# Patient Record
Sex: FEMALE | Race: WHITE | NOT HISPANIC OR LATINO | URBAN - METROPOLITAN AREA
[De-identification: names, ages, dates, MRNs, and addresses within clinical notes are randomized per-mention and may not be internally consistent; named-entity substitution may affect disease eponyms.]

---

## 2018-01-29 ENCOUNTER — TRANSFERRED RECORDS (OUTPATIENT)
Dept: HEALTH INFORMATION MANAGEMENT | Facility: CLINIC | Age: 23
End: 2018-01-29

## 2018-02-12 ENCOUNTER — OFFICE VISIT (OUTPATIENT)
Dept: DERMATOLOGY | Facility: CLINIC | Age: 23
End: 2018-02-12
Payer: COMMERCIAL

## 2018-02-12 VITALS — OXYGEN SATURATION: 100 % | DIASTOLIC BLOOD PRESSURE: 73 MMHG | HEART RATE: 69 BPM | SYSTOLIC BLOOD PRESSURE: 111 MMHG

## 2018-02-12 DIAGNOSIS — L65.9 LOSS OF HAIR: ICD-10-CM

## 2018-02-12 DIAGNOSIS — L65.9 LOSS OF HAIR: Primary | ICD-10-CM

## 2018-02-12 DIAGNOSIS — J02.9 SORE THROAT: ICD-10-CM

## 2018-02-12 DIAGNOSIS — L21.9 DERMATITIS, SEBORRHEIC: ICD-10-CM

## 2018-02-12 DIAGNOSIS — L63.9 AA (ALOPECIA AREATA): ICD-10-CM

## 2018-02-12 LAB
ALBUMIN SERPL-MCNC: 4.5 G/DL (ref 3.4–5)
ALP SERPL-CCNC: 48 U/L (ref 40–150)
ALT SERPL W P-5'-P-CCNC: 21 U/L (ref 0–50)
ANION GAP SERPL CALCULATED.3IONS-SCNC: 6 MMOL/L (ref 3–14)
AST SERPL W P-5'-P-CCNC: 10 U/L (ref 0–45)
BASOPHILS # BLD AUTO: 0.1 10E9/L (ref 0–0.2)
BASOPHILS NFR BLD AUTO: 0.9 %
BILIRUB SERPL-MCNC: 0.5 MG/DL (ref 0.2–1.3)
BUN SERPL-MCNC: 12 MG/DL (ref 7–30)
CALCIUM SERPL-MCNC: 8.9 MG/DL (ref 8.5–10.1)
CHLORIDE SERPL-SCNC: 107 MMOL/L (ref 94–109)
CO2 SERPL-SCNC: 25 MMOL/L (ref 20–32)
CREAT SERPL-MCNC: 0.6 MG/DL (ref 0.52–1.04)
DEPRECATED CALCIDIOL+CALCIFEROL SERPL-MC: 31 UG/L (ref 20–75)
DEPRECATED S PYO AG THROAT QL EIA: NORMAL
DHEA-S SERPL-MCNC: 152 UG/DL (ref 35–430)
DIFFERENTIAL METHOD BLD: ABNORMAL
EOSINOPHIL # BLD AUTO: 0.1 10E9/L (ref 0–0.7)
EOSINOPHIL NFR BLD AUTO: 1.8 %
ERYTHROCYTE [DISTWIDTH] IN BLOOD BY AUTOMATED COUNT: 13.6 % (ref 10–15)
FERRITIN SERPL-MCNC: 34 NG/ML (ref 12–150)
GFR SERPL CREATININE-BSD FRML MDRD: >90 ML/MIN/1.7M2
GLUCOSE SERPL-MCNC: 84 MG/DL (ref 70–99)
HCT VFR BLD AUTO: 41.6 % (ref 35–47)
HGB BLD-MCNC: 13.1 G/DL (ref 11.7–15.7)
IMM GRANULOCYTES # BLD: 0 10E9/L (ref 0–0.4)
IMM GRANULOCYTES NFR BLD: 0.2 %
IRON SATN MFR SERPL: 19 % (ref 15–46)
IRON SERPL-MCNC: 68 UG/DL (ref 35–180)
LYMPHOCYTES # BLD AUTO: 1.5 10E9/L (ref 0.8–5.3)
LYMPHOCYTES NFR BLD AUTO: 27.5 %
MCH RBC QN AUTO: 30.7 PG (ref 26.5–33)
MCHC RBC AUTO-ENTMCNC: 31.5 G/DL (ref 31.5–36.5)
MCV RBC AUTO: 97 FL (ref 78–100)
MONOCYTES # BLD AUTO: 0.5 10E9/L (ref 0–1.3)
MONOCYTES NFR BLD AUTO: 8.3 %
NEUTROPHILS # BLD AUTO: 3.3 10E9/L (ref 1.6–8.3)
NEUTROPHILS NFR BLD AUTO: 61.3 %
NRBC # BLD AUTO: 0 10*3/UL
NRBC BLD AUTO-RTO: 0 /100
PLATELET # BLD AUTO: 138 10E9/L (ref 150–450)
POTASSIUM SERPL-SCNC: 3.7 MMOL/L (ref 3.4–5.3)
PROT SERPL-MCNC: 8.2 G/DL (ref 6.8–8.8)
RBC # BLD AUTO: 4.27 10E12/L (ref 3.8–5.2)
SODIUM SERPL-SCNC: 138 MMOL/L (ref 133–144)
SPECIMEN SOURCE: NORMAL
TIBC SERPL-MCNC: 357 UG/DL (ref 240–430)
WBC # BLD AUTO: 5.4 10E9/L (ref 4–11)

## 2018-02-12 RX ORDER — CLOBETASOL PROPIONATE 0.05 G/100ML
SHAMPOO TOPICAL
Qty: 1 BOTTLE | Refills: 11 | Status: SHIPPED | OUTPATIENT
Start: 2018-02-12 | End: 2019-01-15

## 2018-02-12 RX ORDER — CLOBETASOL PROPIONATE 0.5 MG/G
AEROSOL, FOAM TOPICAL
Qty: 100 G | Refills: 1 | Status: SHIPPED | OUTPATIENT
Start: 2018-02-12

## 2018-02-12 RX ORDER — KETOCONAZOLE 20 MG/ML
SHAMPOO TOPICAL
Qty: 120 ML | Refills: 11 | Status: SHIPPED | OUTPATIENT
Start: 2018-02-12

## 2018-02-12 ASSESSMENT — PAIN SCALES - GENERAL: PAINLEVEL: NO PAIN (0)

## 2018-02-12 NOTE — NURSING NOTE
Dermatology Rooming Note    Amber Fritz's goals for this visit include:   Chief Complaint   Patient presents with     Hair Loss     Amber is here for hair loss. She was referred from another provider. Pt states that other treatments where not helping.

## 2018-02-12 NOTE — PATIENT INSTRUCTIONS
-Start clobetasol shampoo three times per week; massage into dry scalp, leave on for 10 minutes, then lather and rinse out  -Start ketoconazole shampoo three times per week alternating with clobetasol shampoo; massage into wet scalp, leave in for 2-3 minutes, then lather and rinse  -Start clobetasol foam twice a day on new patches  -We will draw labs today and follow up with you about these in a few weeks

## 2018-02-12 NOTE — MR AVS SNAPSHOT
After Visit Summary   2/12/2018    Amber Fritz    MRN: [id number]           Patient Information     Date Of Birth          1995        Visit Information        Provider Department      2/12/2018 9:00 AM Josette Peña MD [facility]        Today's Diagnoses     Loss of hair    -  1    Sore throat        AA (alopecia areata)        Dermatitis, seborrheic          Care Instructions    -Start clobetasol shampoo three times per week; massage into dry scalp, leave on for 10 minutes, then lather and rinse out  -Start ketoconazole shampoo three times per week alternating with clobetasol shampoo; massage into wet scalp, leave in for 2-3 minutes, then lather and rinse  -Start clobetasol foam twice a day on new patches  -We will draw labs today and follow up with you about these in a few weeks            Follow-ups after your visit        Your next 10 appointments already scheduled     Feb 12, 2018 10:30 AM CST   LAB with  LAB   [facility] ([facility])    [address] [place] [id number] [phone]           Please do not eat 10-12 hours before your appointment if you are coming in fasting for labs on lipids, cholesterol, or glucose (sugar). This does not apply to pregnant women. Water, hot tea and black coffee (with nothing added) are okay. Do not drink other fluids, diet soda or chew gum.            Mar 22, 2018 11:45 AM CDT   (Arrive by 11:30 AM)   Return Visit with Josette Peña MD   [facility] ([facility])    [address] [place] [id number] [phone]              Future tests that were ordered for you today     Open Future Orders        Priority Expected Expires Ordered    CBC with platelets differential Routine  2/12/2019 2/12/2018    Iron and iron binding capacity Routine  2/12/2019 2/12/2018    Ferritin Routine  2/12/2019 2/12/2018     DHEA sulfate Routine  2019    Testosterone Free and Total Routine  2019    Zinc Routine  2019    IgE Routine  2019    Vitamin D Deficiency Routine  2019    Comprehensive metabolic panel Routine  2019            Who to contact     Please call your clinic at [phone] to:    Ask questions about your health    Make or cancel appointments    Discuss your medicines    Learn about your test results    Speak to your doctor            Additional Information About Your Visit        [employer] Information     [employer] is an electronic gateway that provides easy, online access to your medical records. With [employer], you can request a clinic appointment, read your test results, renew a prescription or communicate with your care team.     To sign up for [employer] visit the website at [email or web address]   You will be asked to enter the access code listed below, as well as some personal information. Please follow the directions to create your username and password.     Your access code is: [email or web address]  Expires: 2018  1:42 PM     Your access code will  in 90 days. If you need help or a new code, please contact your [facility] or call [phone] for assistance.        Care EveryWhere ID     This is your Care EveryWhere ID. This could be used by other organizations to access your [place] medical records  [id number]        Your Vitals Were     Pulse Pulse Oximetry                69 100%           Blood Pressure from Last 3 Encounters:   18 111/73    Weight from Last 3 Encounters:   No data found for Wt              We Performed the Following     Rapid strep screen          Today's Medication Changes          These changes are accurate as of 18 10:17 AM.  If you have any questions, ask your nurse or doctor.               Start taking these medicines.        Dose/Directions    *  clobetasol propionate 0.05 % Sham   Used for:  AA (alopecia areata)   Started by:  Josette Peña MD        Use three times per week. Apply to dry scalp, leave on for 10 minutes, then lather and rinse out.   Quantity:  1 Bottle   Refills:  11       * clobetasol propionate 0.05 % Foam   Used for:  AA (alopecia areata)   Started by:  Josette Peña MD        Apply sparingly to affected area twice daily as needed for new spots   Quantity:  100 g   Refills:  1       ketoconazole 2 % shampoo   Commonly known as:  NIZORAL   Used for:  Dermatitis, seborrheic   Started by:  Josette Peña MD        Apply to wet hair and massage into scalp, leave in for 2-3 minutes, then rinse   Quantity:  120 mL   Refills:  11       * Notice:  This list has 2 medication(s) that are the same as other medications prescribed for you. Read the directions carefully, and ask your doctor or other care provider to review them with you.         Where to get your medicines      These medications were sent to [facility] - [place], [place] - [address] [facility]  [address] [facility] [place] [address]     Phone:  [phone]     clobetasol propionate 0.05 % Foam    clobetasol propionate 0.05 % Sham    ketoconazole 2 % shampoo                Primary Care Provider    None Specified       No primary provider on file.        Equal Access to Services     JOHANNE DC : Haddanny Holman, waaxda lushelton, qaybta kaaljerry callahan, keith grajeda . [facility] [phone].    ATENCIÓN: Si habla español, tiene a messer disposición servicios gratuitos de asistencia lingüística. Maryse al [phone].    We comply with applicable federal civil rights laws and [place] laws. We do not discriminate on the basis of race, color, national origin, age, disability, sex, sexual orientation, or gender identity.            Thank you!     Thank you for choosing [facility]  for your care. Our goal is always to provide you with excellent care. Hearing back from our patients is one way we can continue to improve our services. Please take a few minutes to complete the written survey that you may receive in the mail after your visit with us. Thank you!             Your Updated Medication List - Protect others around you: Learn how to safely use, store and throw away your medicines at www.disposemymeds.org.          This list is accurate as of 2/12/18 10:17 AM.  Always use your most recent med list.                   Brand Name Dispense Instructions for use Diagnosis    * clobetasol propionate 0.05 % Sham     1 Bottle    Use three times per week. Apply to dry scalp, leave on for 10 minutes, then lather and rinse out.    AA (alopecia areata)       * clobetasol propionate 0.05 % Foam     100 g    Apply sparingly to affected area twice daily as needed for new spots    AA (alopecia areata)       ketoconazole 2 % shampoo    NIZORAL    120 mL    Apply to wet hair and massage into scalp, leave in for 2-3 minutes, then rinse    Dermatitis, seborrheic       * Notice:  This list has 2 medication(s) that are the same as other medications prescribed for you. Read the directions carefully, and ask your doctor or other care provider to review them with you.

## 2018-02-12 NOTE — PROGRESS NOTES
[facility] Dermatology Note      Dermatology Problem List:  1.Alopecia areata - s/p bx 1/29 at outside facility c/w AA  -S/p ILK injections (last injected 1/29/18)  -Ketoconazole 2% shampoo QOD  -Clobetasol 0.05% shampoo, alternating with 2% ketoconazole shampoo   -Spot treatment BID with clobetasol 0.05% cream PRN   -Labs 2/12 - iron studies, CMP, CBC with diff, IgE level, DHEA-S, testosterone, vitamin D level, zinc   -Throat swab taken for Group A Strep culture 2/12/18      Encounter Date: Feb 12, 2018    CC:  Chief Complaint   Patient presents with     Hair Loss     Amber is here for hair loss. She was referred from another provider. Pt states that other treatments where not helping.          History of Present Illness:  Ms. Amber Fritz is a 22 year old female who presents with her parents as a referral from Dr. Zaynab Fuentes at [facility] for alopecia areata. She reports first noticing a patch the size of a quarter on the vertex of her scalp in October 2017, after which she went to see Dr. Fuentes who injected 10mg/cc ILK and did several labs including ferritin, vitamin D, TSH; ferritin, vitamin D was 28, TSH was WNL. She has since had three sets of ILK injections including 30mg/cc to the scalp. She feels that these have helped a little although continues to notice new spots of hair loss. Eyebrows and eyelashes have been stable. She has noticed a new patch of hair loss in the pubic area, body hair has been otherwise stable. She denies scalp itching, pain, burning, or tingling. She denies any nail changes.     She has not used any topical steroids, is shampooing every other day with nioxin shampoo and conditioner. She is taking vitamin D 1000IU QD, ferrous gluconate 27mg QD, hair/skin/nails pills. She is not on any hormone containing medications. She reports regular periods although her last period lasted 13 days. She reports having a cold on and off for the last year with sore throat.  Reports a significant amount of stress relating to the hair loss as well as school. She is otherwise healthy.     A biopsy was done 1/29/18 that was read as consistent with AA. She and her parents are very concerned about prognosis of this disease and what they can expect in the future.     Past Medical History:   There is no problem list on file for this patient.    History reviewed. No pertinent past medical history.  History reviewed. No pertinent surgical history.     Has a history of asthma in childhood that has largely resolved, she uses a rescue inhaler occasionally.    Social History:  The patient is in [employer] school in [place].     Family History:  No family history of autoimmune disorders or hair diseases.     Medications:  No current outpatient prescriptions on file.     Allergies   Allergen Reactions     Amoxicillin Nausea         Review of Systems:  -As per HPI  -Skin: As above in HPI. No additional skin concerns.    Physical exam:  Vitals: /73 (BP Location: Right arm, Patient Position: Sitting, Cuff Size: Adult Regular)  Pulse 69  SpO2 100%  GEN: This is a well developed, well-nourished female in no acute distress, in a pleasant mood.    SKIN: Focused examination of the scalp, face, upper and lower extremities, fingernails was performed.  -Patches of alopecia on vertex and occipital scalp, single patch on frontal scalp, most with vellus hair growth and scattered terminal fibers  -Hair pull test negative  -Very mild diffuse adherent scalp scale, no erythema   -Mild scalp atrophy in several areas   -Eyebrows and eyelashes WNL   -Mild pitting and ridging of fingernails   -No other lesions of concern on areas examined.     Impression/Plan:  1. Alopecia areata - Clinical picture and biopsy are consistent with alopecia areata. Discussed that disease course is unpredictable, although given mild patchy disease currently is unlikely to progress to total hair loss. Will not proceed with ILK  injections today as she last had these three weeks ago. Discussed adding prophylaxis with clobetasol shampoo, and clobetasol cream to treat new spots. Will reassess labs today including iron studies, vitamin D, zinc level, DHEA-S, testosterone, CMP and CBC with diff also given ongoing cold symptoms.     Start clobetasol 0.05% shampoo QOD, alternating with ketoconazole 2% shampoo     Start clobetasol 0.05% cream on new patches BID PRN     Labs today including iron studies, CMP, vitamin D, zinc, IgE level, DHEA-S, testosterone, CBC with diff    Throat swab for GAS culture     Photographs taken for future reference      CC Dr. Zaynab Fuentes on close of this encounter.  Follow-up in 2 months, earlier for new or changing lesions.     Staff Involved:  Scribed by Sandy Fair, MS4 for Dr. Peña.        I agree with the PFSH and ROS as completed by the Medical Student. The remainder of the encounter was performed by me and scribed by the Medical Student. The scribed note accurately reflects my personal services and the medical decisions made by me.    Josette Peña MD  Professor and Chair  Department of Dermatology  [facility]

## 2018-02-12 NOTE — LETTER
2/12/2018       RE: Amber Fritz  [address] [address] [place] [id number]     Dear Colleague,    Thank you for referring your patient, Amber Fritz, to the [facility] at [facility]. Please see a copy of my visit note below.    [facility] Dermatology Note      Dermatology Problem List:  1.Alopecia areata - s/p bx 1/29 at outside facility c/w AA  -S/p ILK injections (last injected 1/29/18)  -Ketoconazole 2% shampoo QOD  -Clobetasol 0.05% shampoo, alternating with 2% ketoconazole shampoo   -Spot treatment BID with clobetasol 0.05% cream PRN   -Labs 2/12 - iron studies, CMP, CBC with diff, IgE level, DHEA-S, testosterone, vitamin D level, zinc   -Throat swab taken for Group A Strep culture 2/12/18      Encounter Date: Feb 12, 2018    CC:  Chief Complaint   Patient presents with     Hair Loss     Amber is here for hair loss. She was referred from another provider. Pt states that other treatments where not helping.          History of Present Illness:  Ms. Amber Fritz is a 22 year old female who presents with her parents as a referral from Dr. Zaynab Fuentes at [facility] for alopecia areata. She reports first noticing a patch the size of a quarter on the vertex of her scalp in October 2017, after which she went to see Dr. Fuentes who injected 10mg/cc ILK and did several labs including ferritin, vitamin D, TSH; ferritin, vitamin D was 28, TSH was WNL. She has since had three sets of ILK injections including 30mg/cc to the scalp. She feels that these have helped a little although continues to notice new spots of hair loss. Eyebrows and eyelashes have been stable. She has noticed a new patch of hair loss in the pubic area, body hair has been otherwise stable. She denies scalp itching, pain, burning, or tingling. She denies any nail changes.     She has not used any topical steroids, is shampooing every other day with nioxin shampoo and conditioner.  She is taking vitamin D 1000IU QD, ferrous gluconate 27mg QD, hair/skin/nails pills. She is not on any hormone containing medications. She reports regular periods although her last period lasted 13 days. She reports having a cold on and off for the last year with sore throat. Reports a significant amount of stress relating to the hair loss as well as school. She is otherwise healthy.     A biopsy was done 1/29/18 that was read as consistent with AA. She and her parents are very concerned about prognosis of this disease and what they can expect in the future.     Past Medical History:   There is no problem list on file for this patient.    History reviewed. No pertinent past medical history.  History reviewed. No pertinent surgical history.     Has a history of asthma in childhood that has largely resolved, she uses a rescue inhaler occasionally.    Social History:  The patient is in [employer] school in [place].     Family History:  No family history of autoimmune disorders or hair diseases.     Medications:  No current outpatient prescriptions on file.     Allergies   Allergen Reactions     Amoxicillin Nausea         Review of Systems:  -As per HPI  -Skin: As above in HPI. No additional skin concerns.    Physical exam:  Vitals: /73 (BP Location: Right arm, Patient Position: Sitting, Cuff Size: Adult Regular)  Pulse 69  SpO2 100%  GEN: This is a well developed, well-nourished female in no acute distress, in a pleasant mood.    SKIN: Focused examination of the scalp, face, upper and lower extremities, fingernails was performed.  -Patches of alopecia on vertex and occipital scalp, single patch on frontal scalp, most with vellus hair growth and scattered terminal fibers  -Hair pull test negative  -Very mild diffuse adherent scalp scale, no erythema   -Mild scalp atrophy in several areas   -Eyebrows and eyelashes WNL   -Mild pitting and ridging of fingernails   -No other lesions of concern on areas  examined.     Impression/Plan:  1. Alopecia areata - Clinical picture and biopsy are consistent with alopecia areata. Discussed that disease course is unpredictable, although given mild patchy disease currently is unlikely to progress to total hair loss. Will not proceed with ILK injections today as she last had these three weeks ago. Discussed adding prophylaxis with clobetasol shampoo, and clobetasol cream to treat new spots. Will reassess labs today including iron studies, vitamin D, zinc level, DHEA-S, testosterone, CMP and CBC with diff also given ongoing cold symptoms.     Start clobetasol 0.05% shampoo QOD, alternating with ketoconazole 2% shampoo     Start clobetasol 0.05% cream on new patches BID PRN     Labs today including iron studies, CMP, vitamin D, zinc, IgE level, DHEA-S, testosterone, CBC with diff    Throat swab for GAS culture     Photographs taken for future reference      CC Dr. Zaynab Fuentes on close of this encounter.  Follow-up in 2 months, earlier for new or changing lesions.     Staff Involved:  Scribed by Sandy Fair, MS4 for Dr. Peña.        I agree with the PFSH and ROS as completed by the Medical Student. The remainder of the encounter was performed by me and scribed by the Medical Student. The scribed note accurately reflects my personal services and the medical decisions made by me.    Josette Peña MD  Professor and Chair  Department of Dermatology  [facility]    Pictures were placed in Pt's chart today for future reference.              Again, thank you for allowing me to participate in the care of your patient.      Sincerely,    Josette Peña MD

## 2018-02-13 ENCOUNTER — TELEPHONE (OUTPATIENT)
Dept: DERMATOLOGY | Facility: CLINIC | Age: 23
End: 2018-02-13

## 2018-02-13 LAB
IGE SERPL-ACNC: 79 KIU/L (ref 0–114)
SHBG SERPL-SCNC: 69 NMOL/L (ref 30–135)
TESTOST FREE SERPL-MCNC: 0.18 NG/DL (ref 0.08–0.74)
TESTOST SERPL-MCNC: 18 NG/DL (ref 8–60)

## 2018-02-14 ENCOUNTER — TELEPHONE (OUTPATIENT)
Dept: DERMATOLOGY | Facility: CLINIC | Age: 23
End: 2018-02-14

## 2018-02-14 LAB
BACTERIA SPEC CULT: NORMAL
SPECIMEN SOURCE: NORMAL

## 2018-02-14 NOTE — TELEPHONE ENCOUNTER
Pt would like to know what steps are need to obtain an rx for a wig.    Will forward to Sherrell KINSEY to follow.       Shayy Hook RN

## 2018-02-14 NOTE — TELEPHONE ENCOUNTER
Central Prior Authorization Team   Phone: [phone]  FAX: [phone]    PA Initiation    Medication: Clobetasol  Insurance Company: [employer] - Phone [phone] Fax [phone]  Pharmacy Filling the Rx: [facility] - [place], [place] - [address] [facility]  Filling Pharmacy Phone: [phone]  Filling Pharmacy Fax: [phone]  Start Date: 2/14/2018

## 2018-02-15 LAB — ZINC SERPL-MCNC: 88 UG/DL (ref 60–120)

## 2018-02-16 NOTE — TELEPHONE ENCOUNTER
Spoke with Amber and recommended checking with insurance company to find out details regarding coverage so there is no unexpected cost. She denies discussing scalp prosthesis at her last visit and did not give details regarding need for it. Writer does not know if  specific criteria needs to be met for Dr Peña to provide Rx. Patient will check with insurance co and give clinic and update. She understands Dr Peña is out of clinic until the end of February.

## 2018-02-16 NOTE — TELEPHONE ENCOUNTER
Left message for patient to contact clinic. A diagnosis of A.A. is needed for coverage by insurance for a scalp prothesis. She can check with her ins as well.

## 2018-02-20 NOTE — TELEPHONE ENCOUNTER
Central Prior Authorization Team   Phone: [phone]      PRIOR AUTHORIZATION DENIED    Medication: Clobetasol - Denied    Denial Date: 2/20/2018    Denial Rational:         Appeal Information:

## 2018-02-25 PROBLEM — L21.9 DERMATITIS, SEBORRHEIC: Status: ACTIVE | Noted: 2018-02-25

## 2018-02-25 PROBLEM — L63.9 AA (ALOPECIA AREATA): Status: ACTIVE | Noted: 2018-02-25

## 2018-02-25 PROBLEM — L65.9 LOSS OF HAIR: Status: ACTIVE | Noted: 2018-02-25

## 2018-03-01 ENCOUNTER — TELEPHONE (OUTPATIENT)
Dept: DERMATOLOGY | Facility: CLINIC | Age: 23
End: 2018-03-01

## 2018-03-01 NOTE — TELEPHONE ENCOUNTER
Pt called stating that she only received one shampoo from her pharmacy and was informed that other shampoo and foam are not covered by her insurance. She did not know the name of the shampoo that she picked up. Pt will call pharmacy and find out name of shampoo that was covered and call me back with the names of the shampoo/foam that was not covered by her insurance.     Jayesh Persaud LPN

## 2018-03-01 NOTE — TELEPHONE ENCOUNTER
Pt called back and stated that clobetasol propionate 0.05 % FOAM and clobetasol propionate 0.05 % SHAM are not covered. Pt would like to know if she is able to get something else that is covered by her insurance. She was able to pick-up the ketoconazole (NIZORAL) 2 % shampoo and is currently using it.     Jayesh Persaud LPN

## 2018-03-01 NOTE — TELEPHONE ENCOUNTER
Discussed with Dr. Peña and she requested that it would be most helpful for the patient to look up in her insurance plan to see what topical steroids are covered and then we can pick from the meds listed. We can continue to send other steroids but by checking the formula, this could prevent the patient from having to go through this with multiple medications.    If patient unable to get this list, I can send Lidex solution.    Jayesh, can you let the patient know?    Thank you!

## 2018-03-06 ENCOUNTER — MYC MEDICAL ADVICE (OUTPATIENT)
Dept: DERMATOLOGY | Facility: CLINIC | Age: 23
End: 2018-03-06

## 2018-03-06 DIAGNOSIS — L63.9 ALOPECIA AREATA: Primary | ICD-10-CM

## 2018-03-06 RX ORDER — FLUOCINONIDE TOPICAL SOLUTION USP, 0.05% 0.5 MG/ML
SOLUTION TOPICAL
Qty: 60 ML | Refills: 3 | Status: SHIPPED | OUTPATIENT
Start: 2018-03-06

## 2018-03-12 ENCOUNTER — HEALTH MAINTENANCE LETTER (OUTPATIENT)
Age: 23
End: 2018-03-12

## 2018-03-22 ENCOUNTER — OFFICE VISIT (OUTPATIENT)
Dept: DERMATOLOGY | Facility: CLINIC | Age: 23
End: 2018-03-22
Payer: COMMERCIAL

## 2018-03-22 VITALS — WEIGHT: 130 LBS | SYSTOLIC BLOOD PRESSURE: 108 MMHG | DIASTOLIC BLOOD PRESSURE: 70 MMHG | HEART RATE: 68 BPM

## 2018-03-22 DIAGNOSIS — L21.9 DERMATITIS, SEBORRHEIC: ICD-10-CM

## 2018-03-22 DIAGNOSIS — L63.9 AA (ALOPECIA AREATA): Primary | ICD-10-CM

## 2018-03-22 DIAGNOSIS — M35.9 AUTOIMMUNE DISEASE (H): ICD-10-CM

## 2018-03-22 RX ORDER — PREDNISONE 5 MG/1
TABLET ORAL
Qty: 199 TABLET | Refills: 1 | Status: SHIPPED | OUTPATIENT
Start: 2018-03-22

## 2018-03-22 NOTE — PROGRESS NOTES
"[facility] Dermatology Note      Dermatology Problem List:  1..Alopecia areata -  -S/p ILK injections (last injected 1/29/18)  -Ketoconazole 2% shampoo QOD  -Clobetasol 0.05% shampoo, alternating with 2% ketoconazole shampoo   -Spot treatment BID with clobetasol 0.05% cream PRN   -Labs 2/12 - iron studies, CMP, CBC with diff, IgE level, DHEA-S, testosterone, vitamin D level, zinc          CC:   Chief Complaint   Patient presents with     Hair Loss     Amber is visiting for hair loss. She says \"it has gotten worst.\"         Encounter Date: Mar 22, 2018    History of Present Illness:  Ms. Amber Fritz is a 22 year old female who presents with her parents for follow-up on treatment recommendations made at her last visit in February and to discuss further management plans. At her last visit clobetasol 0.05% shampoo QOD was started , alternating with ketoconazole 2% shampoo. It was also recommended that she start  clobetasol 0.05% cream on new patches BID PRN. ILK injections were not done as she had just had these done 3 weeks prior to her first visit here.  Laboratory studies were done  including iron studies, CMP, vitamin D all of which were normal. Initially the clobex shampoo was not covered by insurance so she was prescribed fluocinonide solution which she only started about 2 weeks ago. She has been shampooing every other day alternating with 2% ketoconazole shampoo.  Elizabeth continues to take an iron supplement, ferrous gluconate 27 mg, a [employer] Made hair skin and nails supplement and Vitamin D3 1000 IU every day. Despite this, she reports her hair disease continues to worsen. She denies any significant scalp symptoms.     Past Medical History:   Patient Active Problem List   Diagnosis     AA (alopecia areata)     Dermatitis, seborrheic     Loss of hair     Past Medical History:   Diagnosis Date     AA (alopecia areata) 2/25/2018     History reviewed. No pertinent surgical history.    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    Family History:  No family history of autoimmune disorders or hair diseases.       Medications:  Current Outpatient Prescriptions   Medication Sig Dispense Refill     fluocinonide (LIDEX) 0.05 % solution Apply daily as need to scalp. 60 mL 3     clobetasol propionate 0.05 % SHAM Use three times per week. Apply to dry scalp, leave on for 10 minutes, then lather and rinse out. 1 Bottle 11     ketoconazole (NIZORAL) 2 % shampoo Apply to wet hair and massage into scalp, leave in for 2-3 minutes, then rinse 120 mL 11     clobetasol propionate 0.05 % FOAM Apply sparingly to affected area twice daily as needed for new spots (Patient not taking: Reported on 3/22/2018) 100 g 1     Allergies   Allergen Reactions     Amoxicillin Nausea         ROS  -Constitutional: The patient denies fatigue, fevers, chills, unintended weight loss, and night sweats.  -HEENT: Patient denies nonhealing oral sores.  -Skin: As above in HPI. No additional skin concerns.    Physical exam:  Vitals: /70 (BP Location: Left arm, Patient Position: Chair, Cuff Size: Adult Regular)  Pulse 68  GEN: This is a well developed, well-nourished female in no acute distress, in a pleasant mood.    SKIN: Sun-exposed skin, which includes the head/face, neck, both arms, digits, and/or nails was examined.   -The eyebrows and  eyelashes are normal, and nails have mild pitting.   - Scalp health is improved from last visit with less scale  - Hair pull tests are positive in some but not all regions of the scalp  -Compared to previous photos, there is a slight increase in hair loss in the fronta/parietal scalp region  -Majority of affected areas show vellus or terminal hair growth.   -No other lesions of concern on areas examined.     Impression/Plan:  1. Alopecia areata, with mild scalp disease activity    Continue to shampoo daily if possible or OK every other day, best if could get  Clobex shampoo and rotate with 2% ketoconazole    Ok to use clobetasol cream to new areas as needed    Because of ongoing activity, we discussed oral prednisone therapy. After discussing risks and benefits, Amber elected to try and the following prescription was sent to her pharmacy:    20 mg in am for 1 week, then 15 mg in am for 1 week, then 10 mg in am for 1 week, then 5 mg in am for one week or until next visit.    CC Dr. Zaynab Fuentes on close of this encounter.  Follow-up in 2 months, earlier for new or changing lesions.     Josette Peña MD  Professor and Chair  Department of Dermatology  [facility]

## 2018-03-22 NOTE — LETTER
"3/22/2018       RE: Amber Fritz  [address]  [facility] [address]     Dear Colleague,    Thank you for referring your patient, Amber Fritz, to the [facility] DERMATOLOGY at [facility]. Please see a copy of my visit note below.    [facility] Dermatology Note      Dermatology Problem List:  1..Alopecia areata -  -S/p ILK injections (last injected 1/29/18)  -Ketoconazole 2% shampoo QOD  -Clobetasol 0.05% shampoo, alternating with 2% ketoconazole shampoo   -Spot treatment BID with clobetasol 0.05% cream PRN   -Labs 2/12 - iron studies, CMP, CBC with diff, IgE level, DHEA-S, testosterone, vitamin D level, zinc          CC:   Chief Complaint   Patient presents with     Hair Loss     Amber is visiting for hair loss. She says \"it has gotten worst.\"         Encounter Date: Mar 22, 2018    History of Present Illness:  Ms. Amber Fritz is a 22 year old female who presents with her parents for follow-up on treatment recommendations made at her last visit in February and to discuss further management plans. At her last visit clobetasol 0.05% shampoo QOD was started , alternating with ketoconazole 2% shampoo. It was also recommended that she start  clobetasol 0.05% cream on new patches BID PRN. ILK injections were not done as she had just had these done 3 weeks prior to her first visit here.  Laboratory studies were done  including iron studies, CMP, vitamin D all of which were normal. Initially the clobex shampoo was not covered by insurance so she was prescribed fluocinonide solution which she only started about 2 weeks ago. She has been shampooing every other day alternating with 2% ketoconazole shampoo.  Elizabeth continues to take an iron supplement, ferrous gluconate 27 mg, a Nature Made hair skin and nails supplement and Vitamin D3 1000 IU every day. Despite this, she reports her hair disease continues to worsen. She denies any significant scalp symptoms.     Past " Medical History:   Patient Active Problem List   Diagnosis     AA (alopecia areata)     Dermatitis, seborrheic     Loss of hair     Past Medical History:   Diagnosis Date     AA (alopecia areata) 2/25/2018     History reviewed. No pertinent surgical history.    Social History:  The patient  attends [employer] in [place], [place].    Family History:  No family history of autoimmune disorders or hair diseases.       Medications:  Current Outpatient Prescriptions   Medication Sig Dispense Refill     fluocinonide (LIDEX) 0.05 % solution Apply daily as need to scalp. 60 mL 3     clobetasol propionate 0.05 % SHAM Use three times per week. Apply to dry scalp, leave on for 10 minutes, then lather and rinse out. 1 Bottle 11     ketoconazole (NIZORAL) 2 % shampoo Apply to wet hair and massage into scalp, leave in for 2-3 minutes, then rinse 120 mL 11     clobetasol propionate 0.05 % FOAM Apply sparingly to affected area twice daily as needed for new spots (Patient not taking: Reported on 3/22/2018) 100 g 1     Allergies   Allergen Reactions     Amoxicillin Nausea         ROS  -Constitutional: The patient denies fatigue, fevers, chills, unintended weight loss, and night sweats.  -HEENT: Patient denies nonhealing oral sores.  -Skin: As above in HPI. No additional skin concerns.    Physical exam:  Vitals: /70 (BP Location: Left arm, Patient Position: Chair, Cuff Size: Adult Regular)  Pulse 68  GEN: This is a well developed, well-nourished female in no acute distress, in a pleasant mood.    SKIN: Sun-exposed skin, which includes the head/face, neck, both arms, digits, and/or nails was examined.   -The eyebrows and  eyelashes are normal, and nails have mild pitting.   - Scalp health is improved from last visit with less scale  - Hair pull tests are positive in some but not all regions of the scalp  -Compared to previous photos, there is a slight increase in hair loss in the fronta/parietal scalp  region  -Majority of affected areas show vellus or terminal hair growth.   -No other lesions of concern on areas examined.     Impression/Plan:  1. Alopecia areata, with mild scalp disease activity    Continue to shampoo daily if possible or OK every other day, best if could get Clobex shampoo and rotate with 2% ketoconazole    Ok to use clobetasol cream to new areas as needed    Because of ongoing activity, we discussed oral prednisone therapy. After discussing risks and benefits, Amber elected to try and the following prescription was sent to her pharmacy:    20 mg in am for 1 week, then 15 mg in am for 1 week, then 10 mg in am for 1 week, then 5 mg in am for one week or until next visit.    CC Dr. Zaynab Fuentes on close of this encounter.  Follow-up in 2 months, earlier for new or changing lesions.     Josette Peña MD  Professor and Chair  Department of Dermatology  [facility]                                                Pictures were placed in Pt's chart today for future reference.      Again, thank you for allowing me to participate in the care of your patient.      Sincerely,    Josette Peña MD

## 2018-03-22 NOTE — PATIENT INSTRUCTIONS
Begin prednisone treatment as noted on prescription.    Check out the [employer] web site    Phone follow-up in about 3-4 weeks - expect a call from Libra Olivier.

## 2018-03-22 NOTE — NURSING NOTE
"Dermatology Rooming Note    Amber Fritz's goals for this visit include:   Chief Complaint   Patient presents with     Hair Loss     Amber is visiting for hair loss. She says \"it has gotten worst.\"     Parvin Stinson LPN    "

## 2018-03-22 NOTE — MR AVS SNAPSHOT
After Visit Summary   3/22/2018    Amber Fritz    MRN: [id number]           Patient Information     Date Of Birth          1995        Visit Information        Provider Department      3/22/2018 11:45 AM Josette Peña MD [facility] Dermatology        Care Instructions    Begin prednisone treatment as noted on prescription.    Check out the [employer] web site    Phone follow-up in about 3-4 weeks - expect a call from Libra Olivier.           Follow-ups after your visit        Your next 10 appointments already scheduled     Jun 18, 2018 10:10 AM CDT   (Arrive by 9:55 AM)   RETURN HAIRLOSS with Josette Peña MD   [facility] Dermatology ([facility])    [address] [address] [phone]              Who to contact     Please call your clinic at [phone] to:    Ask questions about your health    Make or cancel appointments    Discuss your medicines    Learn about your test results    Speak to your doctor            Additional Information About Your Visit        [employer] Information     [employer] gives you secure access to your electronic health record. If you see a primary care provider, you can also send messages to your care team and make appointments. If you have questions, please call your primary care clinic.  If you do not have a primary care provider, please call [phone] and they will assist you.      [employer] is an electronic gateway that provides easy, online access to your medical records. With [employer], you can request a clinic appointment, read your test results, renew a prescription or communicate with your care team.     To access your existing account, please contact your [facility] or call [phone] for assistance.        Care EveryWhere ID     This is your Care EveryWhere ID. This could be used by other organizations to access your  [place] medical records  [id number]        Your Vitals Were     Pulse                   68            Blood Pressure from Last 3 Encounters:   03/22/18 108/70   02/12/18 111/73    Weight from Last 3 Encounters:   No data found for Wt              Today, you had the following     No orders found for display       Primary Care Provider Office Phone # Fax #    Rashid Rocha [phone]       [facility] [address]  [facility] [address]        Equal Access to Services     MICHA DC : Hadii aad ku hadasho Soomaali, waaxda luqadaha, qaybta kaalmada adeegyada, waxay idiin hayaan adeeg kharash la'aan . So [facility] [phone].    ATENCIÓN: Si habla español, tiene a messer disposición servicios gratuitos de asistencia lingüística. [facility] [phone].    We comply with applicable federal civil rights laws and [place] laws. We do not discriminate on the basis of race, color, national origin, age, disability, sex, sexual orientation, or gender identity.            Thank you!     Thank you for choosing [facility] DERMATOLOGY  for your care. Our goal is always to provide you with excellent care. Hearing back from our patients is one way we can continue to improve our services. Please take a few minutes to complete the written survey that you may receive in the mail after your visit with us. Thank you!             Your Updated Medication List - Protect others around you: Learn how to safely use, store and throw away your medicines at www.disposemymeds.org.          This list is accurate as of 3/22/18 11:55 AM.  Always use your most recent med list.                   Brand Name Dispense Instructions for use Diagnosis    * clobetasol propionate 0.05 % Sham     1 Bottle    Use three times per week. Apply to dry scalp, leave on for 10 minutes, then lather and rinse out.    AA (alopecia areata)       * clobetasol propionate 0.05 % Foam     100 g    Apply sparingly to affected area twice daily as needed for  new spots    AA (alopecia areata)       fluocinonide 0.05 % solution    LIDEX    60 mL    Apply daily as need to scalp.    Alopecia areata       ketoconazole 2 % shampoo    NIZORAL    120 mL    Apply to wet hair and massage into scalp, leave in for 2-3 minutes, then rinse    Dermatitis, seborrheic       * Notice:  This list has 2 medication(s) that are the same as other medications prescribed for you. Read the directions carefully, and ask your doctor or other care provider to review them with you.

## 2018-03-30 NOTE — PROGRESS NOTES
Pictures were placed in Pt's chart today for future reference.

## 2018-04-01 PROBLEM — M35.9 AUTOIMMUNE DISEASE (H): Status: ACTIVE | Noted: 2018-04-01

## 2018-04-29 ENCOUNTER — TELEPHONE (OUTPATIENT)
Dept: DERMATOLOGY | Facility: CLINIC | Age: 23
End: 2018-04-29

## 2018-04-29 NOTE — TELEPHONE ENCOUNTER
This afternoon I had the opportunity to speak with Amber regarding her alopecia areata. She reports patchy ongoing hair loss activity, is not able to say if there is hair regrowth or not and thinks her scalp health is about the same. She continues to rotate  Clobex shampoo with 2% ketoconazole. We discussed she may need another short course of prednisone. She notes she tolerated the last course very well without any issues. However, as she has an appointment coming up May 3rd, we decided to wait on next steps until the clinic evaluation is completed this coming week.    Josette Peña MD    Cc: Zaynab Fuentes MD

## 2018-05-03 ENCOUNTER — OFFICE VISIT (OUTPATIENT)
Dept: DERMATOLOGY | Facility: CLINIC | Age: 23
End: 2018-05-03
Payer: COMMERCIAL

## 2018-05-03 VITALS — HEART RATE: 82 BPM | DIASTOLIC BLOOD PRESSURE: 69 MMHG | SYSTOLIC BLOOD PRESSURE: 102 MMHG

## 2018-05-03 DIAGNOSIS — J30.2 ACUTE SEASONAL ALLERGIC RHINITIS, UNSPECIFIED TRIGGER: ICD-10-CM

## 2018-05-03 DIAGNOSIS — M35.9 AUTOIMMUNE DISEASE (H): ICD-10-CM

## 2018-05-03 DIAGNOSIS — L63.9 AA (ALOPECIA AREATA): Primary | ICD-10-CM

## 2018-05-03 RX ORDER — PREDNISONE 5 MG/1
TABLET ORAL
Qty: 60 TABLET | Refills: 1 | Status: SHIPPED | OUTPATIENT
Start: 2018-05-03

## 2018-05-03 ASSESSMENT — PAIN SCALES - GENERAL: PAINLEVEL: NO PAIN (0)

## 2018-05-03 NOTE — PROGRESS NOTES
[facility] Dermatology Note      Dermatology Problem List:  1..Alopecia areata -  -S/p ILK 10  Injections, today included   -Ketoconazole 2% shampoo QOD  -Clobetasol 0.05% shampoo, alternating with 2% ketoconazole shampoo   -Spot treatment BID with clobetasol 0.05% cream PRN   -Zyrtec daily    CC:   Chief Complaint   Patient presents with     Derm Problem     Amber is here today for a follow up on hairloss. Patient has noticied 3 new spots of concern         Encounter Date: May 3, 2018    History of Present Illness:  Ms. Amber Fritz is a 23 year old female who presents for follow-up of her alopecia areata.  She continues to report no significant scalp symptoms and overall, has been in good health since her last visit. She recently completed a prednisone course and reports decreased hair shedding.     Elizabeth continues to take an iron supplement, ferrous gluconate 27 mg, a [employer] Made hair skin and nails supplement and Vitamin D3 1000 IU every day        Past Medical History:   Patient Active Problem List   Diagnosis     AA (alopecia areata)     Dermatitis, seborrheic     Loss of hair     Autoimmune disease (H)     Past Medical History:   Diagnosis Date     AA (alopecia areata) 2/25/2018     Autoimmune disease (H) 4/1/2018     History reviewed. No pertinent surgical history.    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    Medications:  Current Outpatient Prescriptions   Medication Sig Dispense Refill     clobetasol propionate 0.05 % FOAM Apply sparingly to affected area twice daily as needed for new spots 100 g 1     clobetasol propionate 0.05 % SHAM Use three times per week. Apply to dry scalp, leave on for 10 minutes, then lather and rinse out. 1 Bottle 11     fluocinonide (LIDEX) 0.05 % solution Apply daily as need to scalp. 60 mL 3     ketoconazole (NIZORAL) 2 % shampoo  Apply to wet hair and massage into scalp, leave in for 2-3 minutes, then rinse 120 mL 11     predniSONE (DELTASONE) 5 MG tablet 20 mg in am for 1 week, then 15 mg in am for 1 week, then 10 mg in am for 1 week, then 5 mg in am for one week or until next visit.    60 tablet 1     predniSONE (DELTASONE) 5 MG tablet Take daily in the morning following this schedule:  4 tablets every morning for 1 week, then take 3 tabs in am for 1 week,, then 2 tabs every am for 1 week, then1 tab in am for 1 week 199 tablet 1     Allergies   Allergen Reactions     Amoxicillin Nausea         Review of Systems:  -Constitutional: The patient denies fatigue, fevers, chills, unintended weight loss, and night sweats.  -HEENT: Patient denies nonhealing oral sores.  -Skin: As above in HPI. No additional skin concerns.    Physical exam:  Vitals: /69  Pulse 82  GEN: This is a well developed, well-nourished female in no acute distress, in a pleasant mood.    Examination of the skin was focused to the scalp, face and hands.  Eyebrows and eyelashes continue to be normal appearing.  Hair pull tests were overall negative compared to the last visit.  Vellus and terminal hair growth persist in the treated areas.   -No other lesions of concern on areas examined.     Impression/Plan:      IOk to repeat prednisone course at a different phase of the alopecia areata process but only after the cold is gone    Follow-up 2 months    zyrtex daily    1. Alopecia areata, with significantly less scalp hair loss activity  We discussed the value of another prednisone course at this time when her disease is relatively stable; my recommendation is to do ILK 10 injections locally.    Kenalog intralesional injection procedure note: After verbal consent and discussion of risks including but not limited to atrophy, pain, and bruising, time out was performed, the patient underwent positioning and a  total of 3 cc Kenalog 10 mg/cc was injected into 30 sites on the  scalp.   The patient tolerated the procedure well and left the Dermatology clinic in good condition.    Ok to use clobetasol cream to new areas if needed  Continue to work on keeping a healthy scalp - shampoo rotating Clobex with 2% ketoconazole  Prednisone prescription given for a slow taper over one month period if needed.    2. Atopic history with seasonal allergies  Recommend Zyrtec daily until next visit, not as needed, to help with overall inflammation  Referral to Allergy    Follow-up 2 months.    Josette Peña MD  Professor and Chair  Department of Dermatology  [facility]        Staff Involved:                          Picture placed in chart for future reference.

## 2018-05-03 NOTE — LETTER
5/3/2018       RE: Amber Fritz  [address]  [facility] [address]     Dear Colleague,    Thank you for referring your patient, Amber Fritz, to the [facility] DERMATOLOGY at [facility]. Please see a copy of my visit note below.    [facility] Dermatology Note      Dermatology Problem List:  1..Alopecia areata -  -S/p ILK 10  Injections, today included   -Ketoconazole 2% shampoo QOD  -Clobetasol 0.05% shampoo, alternating with 2% ketoconazole shampoo   -Spot treatment BID with clobetasol 0.05% cream PRN   -Zyrtec daily    CC:   Chief Complaint   Patient presents with     Derm Problem     Amber is here today for a follow up on hairloss. Patient has noticied 3 new spots of concern         Encounter Date: May 3, 2018    History of Present Illness:  Ms. Amber Fritz is a 23 year old female who presents for follow-up of her alopecia areata.  She continues to report no significant scalp symptoms and overall, has been in good health since her last visit. She recently completed a prednisone course and reports decreased hair shedding.     Elizabeth continues to take an iron supplement, ferrous gluconate 27 mg, a [employer] hair skin and nails supplement and Vitamin D3 1000 IU every day        Past Medical History:   Patient Active Problem List   Diagnosis     AA (alopecia areata)     Dermatitis, seborrheic     Loss of hair     Autoimmune disease (H)     Past Medical History:   Diagnosis Date     AA (alopecia areata) 2/25/2018     Autoimmune disease (H) 4/1/2018     History reviewed. No pertinent surgical history.    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    Medications:  Current Outpatient Prescriptions   Medication Sig Dispense Refill     clobetasol propionate 0.05 % FOAM Apply sparingly to affected area twice daily as needed for new spots  100 g 1     clobetasol propionate 0.05 % SHAM Use three times per week. Apply to dry scalp, leave on for 10 minutes, then lather and rinse out. 1 Bottle 11     fluocinonide (LIDEX) 0.05 % solution Apply daily as need to scalp. 60 mL 3     ketoconazole (NIZORAL) 2 % shampoo Apply to wet hair and massage into scalp, leave in for 2-3 minutes, then rinse 120 mL 11     predniSONE (DELTASONE) 5 MG tablet 20 mg in am for 1 week, then 15 mg in am for 1 week, then 10 mg in am for 1 week, then 5 mg in am for one week or until next visit.    60 tablet 1     predniSONE (DELTASONE) 5 MG tablet Take daily in the morning following this schedule:  4 tablets every morning for 1 week, then take 3 tabs in am for 1 week,, then 2 tabs every am for 1 week, then1 tab in am for 1 week 199 tablet 1     Allergies   Allergen Reactions     Amoxicillin Nausea         Review of Systems:  -Constitutional: The patient denies fatigue, fevers, chills, unintended weight loss, and night sweats.  -HEENT: Patient denies nonhealing oral sores.  -Skin: As above in HPI. No additional skin concerns.    Physical exam:  Vitals: /69  Pulse 82  GEN: This is a well developed, well-nourished female in no acute distress, in a pleasant mood.    Examination of the skin was focused to the scalp, face and hands.  Eyebrows and eyelashes continue to be normal appearing.  Hair pull tests were overall negative compared to the last visit.  Vellus and terminal hair growth persist in the treated areas.   -No other lesions of concern on areas examined.     Impression/Plan:      IOk to repeat prednisone course at a different phase of the alopecia areata process but only after the cold is gone    Follow-up 2 months    zyrtex daily    1. Alopecia areata, with significantly less scalp hair loss activity  We discussed the value of another prednisone course at this time when her disease is relatively stable; my recommendation is to do ILK 10 injections locally.    Kenalog  intralesional injection procedure note: After verbal consent and discussion of risks including but not limited to atrophy, pain, and bruising, time out was performed, the patient underwent positioning and a  total of 3 cc Kenalog 10 mg/cc was injected into 30 sites on the scalp.   The patient tolerated the procedure well and left the Dermatology clinic in good condition.    Ok to use clobetasol cream to new areas if needed  Continue to work on keeping a healthy scalp - shampoo rotating Clobex with 2% ketoconazole  Prednisone prescription given for a slow taper over one month period if needed.    2. Atopic history with seasonal allergies  Recommend Zyrtec daily until next visit, not as needed, to help with overall inflammation  Referral to Allergy    Follow-up 2 months.    Josette Peña MD  Professor and Chair  Department of Dermatology  [facility]        Staff Involved:                          Picture placed in chart for future reference.       Again, thank you for allowing me to participate in the care of your patient.      Sincerely,    Josette Peña MD

## 2018-05-03 NOTE — NURSING NOTE
Drug Administration Record    Drug Name: triamcinolone acetonide(kenalog)  Dose: 3mL of triamcinolone 10mg/mL, 30mg dose  Route administered: ID  NDC #: Kenalog-10 (0823-1205-36)  Amount of waste(mL):2  Reason for waste: Single use vial    LOT #: [id number]  SITE: [facility]  : [email or web address]  EXPIRATION DATE: Dec2019

## 2018-05-03 NOTE — MR AVS SNAPSHOT
After Visit Summary   5/3/2018    Amber Fritz    MRN: [id number]           Patient Information     Date Of Birth          1995        Visit Information        Provider Department      5/3/2018 11:15 AM Josette Peña MD [facility] Dermatology        Today's Diagnoses     AA (alopecia areata)    -  1    Autoimmune disease (H)        Acute seasonal allergic rhinitis, unspecified trigger           Follow-ups after your visit        Additional Services     ALLERGY/ASTHMA ADULT REFERRAL       Your provider has referred you to Allergy Clinic at the [facility]. Amber has alopecia areata and recurrent URI's - suspect there is an allergic component.                  Your next 10 appointments already scheduled     Jun 18, 2018 10:10 AM CDT   (Arrive by 9:55 AM)   RETURN HAIRLOSS with Josette Peña MD   [facility] Dermatology ([facility] [place])    [address] [place] [id number] [phone]              Who to contact     Please call your clinic at [phone] to:    Ask questions about your health    Make or cancel appointments    Discuss your medicines    Learn about your test results    Speak to your doctor            Additional Information About Your Visit        MyChart Information     [employer] gives you secure access to your electronic health record. If you see a primary care provider, you can also send messages to your care team and make appointments. If you have questions, please call your primary care clinic.  If you do not have a primary care provider, please call [phone] and they will assist you.      [employer] is an electronic gateway that provides easy, online access to your medical records. With [employer], you can request a clinic appointment, read your test results, renew a prescription or communicate with your care team.     To access your existing account, please contact your [facility] or  call [phone] for assistance.        Care EveryWhere ID     This is your Care EveryWhere ID. This could be used by other organizations to access your [place] medical records  [id number]        Your Vitals Were     Pulse                   82            Blood Pressure from Last 3 Encounters:   05/03/18 102/69   03/22/18 108/70   02/12/18 111/73    Weight from Last 3 Encounters:   03/22/18 59 kg (130 lb)              We Performed the Following     ALLERGY/ASTHMA ADULT REFERRAL     INJECTION INTO SKIN LESIONS >7          Today's Medication Changes          These changes are accurate as of 5/3/18 11:59 PM.  If you have any questions, ask your nurse or doctor.               Start taking these medicines.        Dose/Directions    triamcinolone acetonide 10 MG/ML injection   Commonly known as:  KENALOG   Used for:  AA (alopecia areata)   Started by:  Josette Peña MD        See med note   Quantity:  5 mL   Refills:  0         These medicines have changed or have updated prescriptions.        Dose/Directions    * predniSONE 5 MG tablet   Commonly known as:  DELTASONE   This may have changed:  Another medication with the same name was added. Make sure you understand how and when to take each.   Used for:  AA (alopecia areata)   Changed by:  Josette Peña MD        Take daily in the morning following this schedule: 4 tablets every morning for 1 week, then take 3 tabs in am for 1 week,, then 2 tabs every am for 1 week, then1 tab in am for 1 week   Quantity:  199 tablet   Refills:  1       * predniSONE 5 MG tablet   Commonly known as:  DELTASONE   This may have changed:  You were already taking a medication with the same name, and this prescription was added. Make sure you understand how and when to take each.   Used for:  AA (alopecia areata)   Changed by:  Josette Peña MD        20 mg in am for 1 week, then 15 mg in am for 1 week, then 10 mg in am for 1 week, then 5 mg in am  for one week or until next visit.   Quantity:  60 tablet   Refills:  1       * Notice:  This list has 2 medication(s) that are the same as other medications prescribed for you. Read the directions carefully, and ask your doctor or other care provider to review them with you.         Where to get your medicines      Some of these will need a paper prescription and others can be bought over the counter.  Ask your nurse if you have questions.     Bring a paper prescription for each of these medications     predniSONE 5 MG tablet       You don't need a prescription for these medications     triamcinolone acetonide 10 MG/ML injection                Primary Care Provider Office Phone # Fax #    Rashid Rocha [phone]       [facility] [place] [facility] [address]  [facility] [address]        Equal Access to Services     MICHA DC : Donavon Holman, wadanish shook, qajuanjo kaalmakosta callahan, keith mendoza. So [facility] [phone].    ATENCIÓN: Si habla español, tiene a messer disposición servicios gratuitos de asistencia lingüística. [facility] [phone].    We comply with applicable federal civil rights laws and [place] laws. We do not discriminate on the basis of race, color, national origin, age, disability, sex, sexual orientation, or gender identity.            Thank you!     Thank you for choosing [facility] DERMATOLOGY  for your care. Our goal is always to provide you with excellent care. Hearing back from our patients is one way we can continue to improve our services. Please take a few minutes to complete the written survey that you may receive in the mail after your visit with us. Thank you!             Your Updated Medication List - Protect others around you: Learn how to safely use, store and throw away your medicines at www.disposemymeds.org.          This list is accurate as of 5/3/18 11:59 PM.  Always use your most recent med list.                   Brand Name  Dispense Instructions for use Diagnosis    * clobetasol propionate 0.05 % Sham     1 Bottle    Use three times per week. Apply to dry scalp, leave on for 10 minutes, then lather and rinse out.    AA (alopecia areata)       * clobetasol propionate 0.05 % Foam     100 g    Apply sparingly to affected area twice daily as needed for new spots    AA (alopecia areata)       fluocinonide 0.05 % solution    LIDEX    60 mL    Apply daily as need to scalp.    Alopecia areata       ketoconazole 2 % shampoo    NIZORAL    120 mL    Apply to wet hair and massage into scalp, leave in for 2-3 minutes, then rinse    Dermatitis, seborrheic       * predniSONE 5 MG tablet    DELTASONE    199 tablet    Take daily in the morning following this schedule: 4 tablets every morning for 1 week, then take 3 tabs in am for 1 week,, then 2 tabs every am for 1 week, then1 tab in am for 1 week    AA (alopecia areata)       * predniSONE 5 MG tablet    DELTASONE    60 tablet    20 mg in am for 1 week, then 15 mg in am for 1 week, then 10 mg in am for 1 week, then 5 mg in am for one week or until next visit.    AA (alopecia areata)       triamcinolone acetonide 10 MG/ML injection    KENALOG    5 mL    See med note    AA (alopecia areata)       * Notice:  This list has 4 medication(s) that are the same as other medications prescribed for you. Read the directions carefully, and ask your doctor or other care provider to review them with you.

## 2018-06-05 ENCOUNTER — TELEPHONE (OUTPATIENT)
Dept: DERMATOLOGY | Facility: CLINIC | Age: 23
End: 2018-06-05

## 2018-06-05 NOTE — TELEPHONE ENCOUNTER
"Attempted to reach patient per Dr Peña's request. No answer. Will try again later.    \"Hi,     As in the subject line -   In reviewing the chart, I am not sure if she is on prednisone now for her alopecia areata or for her asthma. Can you clarify this with her and ask her about her dosing and how she is doing.     Thanks!\"            "

## 2018-06-18 ENCOUNTER — OFFICE VISIT (OUTPATIENT)
Dept: DERMATOLOGY | Facility: CLINIC | Age: 23
End: 2018-06-18
Payer: COMMERCIAL

## 2018-06-18 VITALS — DIASTOLIC BLOOD PRESSURE: 65 MMHG | SYSTOLIC BLOOD PRESSURE: 101 MMHG | HEART RATE: 64 BPM

## 2018-06-18 DIAGNOSIS — M35.9 AUTOIMMUNE DISEASE (H): ICD-10-CM

## 2018-06-18 DIAGNOSIS — L63.9 ALOPECIA AREATA: Primary | ICD-10-CM

## 2018-06-18 DIAGNOSIS — L21.9 DERMATITIS, SEBORRHEIC: ICD-10-CM

## 2018-06-18 DIAGNOSIS — L65.9 LOSS OF HAIR: ICD-10-CM

## 2018-06-18 ASSESSMENT — PAIN SCALES - GENERAL: PAINLEVEL: NO PAIN (0)

## 2018-06-18 NOTE — MR AVS SNAPSHOT
After Visit Summary   6/18/2018    Amber Fritz    MRN: [id number]           Patient Information     Date Of Birth          1995        Visit Information        Provider Department      6/18/2018 10:10 AM Josette Peña MD [facility] Dermatology         Follow-ups after your visit        Your next 10 appointments already scheduled     Jul 31, 2018  4:30 PM CDT   (Arrive by 4:15 PM)   RETURN HAIRLOSS with Josette Peña MD   [facility] Dermatology ([facility] [place])    [address] [place] [id number] [phone]              Who to contact     Please call your clinic at [phone] to:    Ask questions about your health    Make or cancel appointments    Discuss your medicines    Learn about your test results    Speak to your doctor            Additional Information About Your Visit        MyChart Information     [employer] gives you secure access to your electronic health record. If you see a primary care provider, you can also send messages to your care team and make appointments. If you have questions, please call your primary care clinic.  If you do not have a primary care provider, please call [phone] and they will assist you.      [employer] is an electronic gateway that provides easy, online access to your medical records. With [employer], you can request a clinic appointment, read your test results, renew a prescription or communicate with your care team.     To access your existing account, please contact your [facility] or call [phone] for assistance.        Care EveryWhere ID     This is your Care EveryWhere ID. This could be used by other organizations to access your [place] medical records  [id number]        Your Vitals Were     Pulse                   64            Blood Pressure from Last 3 Encounters:   06/18/18 101/65   05/03/18 102/69   03/22/18 108/70    Weight from Last 3  Encounters:   03/22/18 59 kg (130 lb)              Today, you had the following     No orders found for display       Primary Care Provider Office Phone # Fax #    Rashid Rocha [phone]       [facility] [address]  [facility] [address]        Equal Access to Services     MICHA DC : Hadii aad ku hadasho Soomaali, waaxda luqadaha, qaybta kaalmada adeegyada, waxay cinthiain haykameron schroeder ankitmoiz mendoza. So [facility] [phone].    ATENCIÓN: Si habla español, tiene a messer disposición servicios gratuitos de asistencia lingüística. Maryse al [phone].    We comply with applicable federal civil rights laws and [place] laws. We do not discriminate on the basis of race, color, national origin, age, disability, sex, sexual orientation, or gender identity.            Thank you!     Thank you for choosing [facility] DERMATOLOGY  for your care. Our goal is always to provide you with excellent care. Hearing back from our patients is one way we can continue to improve our services. Please take a few minutes to complete the written survey that you may receive in the mail after your visit with us. Thank you!             Your Updated Medication List - Protect others around you: Learn how to safely use, store and throw away your medicines at www.disposemymeds.org.          This list is accurate as of 6/18/18 11:27 AM.  Always use your most recent med list.                   Brand Name Dispense Instructions for use Diagnosis    * clobetasol propionate 0.05 % Sham     1 Bottle    Use three times per week. Apply to dry scalp, leave on for 10 minutes, then lather and rinse out.    AA (alopecia areata)       * clobetasol propionate 0.05 % Foam     100 g    Apply sparingly to affected area twice daily as needed for new spots    AA (alopecia areata)       fluocinonide 0.05 % solution    LIDEX    60 mL    Apply daily as need to scalp.    Alopecia areata       ketoconazole 2 % shampoo    NIZORAL    120 mL    Apply  to wet hair and massage into scalp, leave in for 2-3 minutes, then rinse    Dermatitis, seborrheic       * predniSONE 5 MG tablet    DELTASONE    199 tablet    Take daily in the morning following this schedule: 4 tablets every morning for 1 week, then take 3 tabs in am for 1 week,, then 2 tabs every am for 1 week, then1 tab in am for 1 week    AA (alopecia areata)       * predniSONE 5 MG tablet    DELTASONE    60 tablet    20 mg in am for 1 week, then 15 mg in am for 1 week, then 10 mg in am for 1 week, then 5 mg in am for one week or until next visit.    AA (alopecia areata)       triamcinolone acetonide 10 MG/ML injection    KENALOG    5 mL    See med note    AA (alopecia areata)       * Notice:  This list has 4 medication(s) that are the same as other medications prescribed for you. Read the directions carefully, and ask your doctor or other care provider to review them with you.

## 2018-06-18 NOTE — NURSING NOTE
Dermatology Rooming Note    Amber Fritz's goals for this visit include:   Chief Complaint   Patient presents with     Hair Loss     Amber is returning to discuss hair loss related to alopecia areata. She has not noticed a change since her last visit.     Parvin Stinson LPN

## 2018-06-18 NOTE — NURSING NOTE
Drug Administration Record    Drug Name: triamcinolone acetonide(kenalog)  Dose: 3mL of triamcinolone 10mg/mL, 30mg dose  Route administered: ID  NDC #: Kenalog-10 (9995-4442-87)  Amount of waste(mL):2  Reason for waste: Single use vial    LOT #: [id number]  SITE: [facility]  : [employer]  EXPIRATION DATE: DEC2019

## 2018-06-18 NOTE — LETTER
6/18/2018       RE: Amber Fritz  [address]  [facility] [facility] [address]     Dear Colleague,    Thank you for referring your patient, Amber Fritz, to the [facility] DERMATOLOGY at [facility]. Please see a copy of my visit note below.    [facility] Dermatology Note      Dermatology Problem List:  1..Alopecia areata -  -S/p ILK 10  Injections, today included   -Ketoconazole 2% shampoo QOD  -Clobetasol 0.05% shampoo, alternating with 2% ketoconazole shampoo   -Spot treatment BID with clobetasol 0.05% cream PRN   -Zyrtec daily    CC:   Chief Complaint   Patient presents with     Hair Loss     Amber is returning to discuss hair loss related to alopecia areata. She has not noticed a change since her last visit.         Encounter Date: Jun 18, 2018    History of Present Illness:  Ms. Amber Fritz is a 23 year old female who presents for follow-up of her alopecia areata.  The patient was last seen 5/3/2018. She stopped prednisone 2 weeks ago. The patient is alternating clobetasol and ketoconazole shampoo. She is taking iron and vitamin D. Hair regrowth in several  patches, but overall she feels there is still a lot of hair loss.       Past Medical History:   Patient Active Problem List   Diagnosis     AA (alopecia areata)     Dermatitis, seborrheic     Loss of hair     Autoimmune disease (H)     Past Medical History:   Diagnosis Date     AA (alopecia areata) 2/25/2018     Autoimmune disease (H) 4/1/2018     History reviewed. No pertinent surgical history.    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    Medications:  Current Outpatient Prescriptions   Medication Sig Dispense Refill     clobetasol propionate 0.05 % FOAM Apply sparingly to affected area twice daily as needed for new spots 100 g 1     clobetasol propionate 0.05 % SHAM Use three  times per week. Apply to dry scalp, leave on for 10 minutes, then lather and rinse out. 1 Bottle 11     ketoconazole (NIZORAL) 2 % shampoo Apply to wet hair and massage into scalp, leave in for 2-3 minutes, then rinse 120 mL 11     triamcinolone acetonide (KENALOG) 10 MG/ML injection See med note 5 mL 0     fluocinonide (LIDEX) 0.05 % solution Apply daily as need to scalp. (Patient not taking: Reported on 6/18/2018) 60 mL 3     predniSONE (DELTASONE) 5 MG tablet 20 mg in am for 1 week, then 15 mg in am for 1 week, then 10 mg in am for 1 week, then 5 mg in am for one week or until next visit.    (Patient not taking: Reported on 6/18/2018) 60 tablet 1     predniSONE (DELTASONE) 5 MG tablet Take daily in the morning following this schedule:  4 tablets every morning for 1 week, then take 3 tabs in am for 1 week,, then 2 tabs every am for 1 week, then1 tab in am for 1 week (Patient not taking: Reported on 6/18/2018) 199 tablet 1     Allergies   Allergen Reactions     Amoxicillin Nausea         Review of Systems:  -Constitutional: The patient is feeling generally well.   -Skin: As above in HPI. No additional skin concerns.    Physical exam:  Vitals: /65  Pulse 64  GEN: This is a well developed, well-nourished female in no acute distress, in a pleasant mood.  She presents to clinic with her parents.  SKIN: Examination of the skin was focused to the scalp, face and hands.  -Eyebrows and eyelashes continue to be normal appearing.  -Hair pull tests were overall negative  -Vellus and terminal hair growth persist in the treated areas.   -Atrophy due to previous injections on the frontal scalp.   -Positive pull test on the right post auricluar region  -No other lesions of concern on areas examined.      Impression/Plan:  1. Alopecia areata, with significantly less scalp hair loss activity    Kenalog intralesional injection procedure note: After verbal consent and discussion of risks including but not limited to atrophy,  pain, and bruising, time out was performed, the patient underwent positioning and a  total of 3 cc Kenalog 10 mg/cc was injected into 30 sites on the scalp.   The patient tolerated the procedure well and left the Dermatology clinic in good condition.     Ok to use clobetasol cream to new areas if needed    Continue to work on keeping a healthy scalp - shampoo rotating 5x weekly Clobex with on the rest of the days 2% ketoconazole    Hold Iron supplement     Start Multivitamin     Continue Vit D supplement.     Will consider working with psychologist connected to our clinic, [facility]    2. Atopic history with seasonal allergies    Continue Zyrtec daily    3. Skin atrophy, related to ILK injections affected areas not treated today    Follow-up 6 weeks, earlier for new or changing lesions.       Staff Involved:  Scribe/Staff     Scribe Disclosure:   I, Maryse Hebert, am serving as a scribe to document services personally performed by Dr. Josette Peña, based on data collection and the provider's statements to me.     I agree with the PFSH and ROS as completed by the scribe. The remainder of the encounter was performed by me and scribed by Maryse.. The scribed note accurately reflects my personal services and the medical decisions made by me.      Josette Peña MD  Professor and Chair  Department of Dermatology  [facility]                                        Pictures were placed in Pt's chart today for future reference.      Again, thank you for allowing me to participate in the care of your patient.      Sincerely,    Josette Peña MD

## 2018-06-18 NOTE — LETTER
6/18/2018      RE: Amber Fritz  [address]  [facility] [address]       [facility] Dermatology Note      Dermatology Problem List:  1..Alopecia areata -  -S/p ILK 10  Injections, today included   -Ketoconazole 2% shampoo QOD  -Clobetasol 0.05% shampoo, alternating with 2% ketoconazole shampoo   -Spot treatment BID with clobetasol 0.05% cream PRN   -Zyrtec daily    CC:   Chief Complaint   Patient presents with     Hair Loss     Amber is returning to discuss hair loss related to alopecia areata. She has not noticed a change since her last visit.         Encounter Date: Jun 18, 2018    History of Present Illness:  Ms. Amber Fritz is a 23 year old female who presents for follow-up of her alopecia areata.  The patient was last seen 5/3/2018. She stopped prednisone 2 weeks ago. The patient is alternating clobetasol and ketoconazole shampoo. She is taking iron and vitamin D. Hair regrowth in several  patches, but overall she feels there is still a lot of hair loss.       Past Medical History:   Patient Active Problem List   Diagnosis     AA (alopecia areata)     Dermatitis, seborrheic     Loss of hair     Autoimmune disease (H)     Past Medical History:   Diagnosis Date     AA (alopecia areata) 2/25/2018     Autoimmune disease (H) 4/1/2018     History reviewed. No pertinent surgical history.    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    Medications:  Current Outpatient Prescriptions   Medication Sig Dispense Refill     clobetasol propionate 0.05 % FOAM Apply sparingly to affected area twice daily as needed for new spots 100 g 1     clobetasol propionate 0.05 % SHAM Use three times per week. Apply to dry scalp, leave on for 10 minutes, then lather and rinse out. 1 Bottle 11     ketoconazole (NIZORAL) 2 % shampoo Apply to wet hair and massage into scalp, leave in for 2-3  minutes, then rinse 120 mL 11     triamcinolone acetonide (KENALOG) 10 MG/ML injection See med note 5 mL 0     fluocinonide (LIDEX) 0.05 % solution Apply daily as need to scalp. (Patient not taking: Reported on 6/18/2018) 60 mL 3     predniSONE (DELTASONE) 5 MG tablet 20 mg in am for 1 week, then 15 mg in am for 1 week, then 10 mg in am for 1 week, then 5 mg in am for one week or until next visit.    (Patient not taking: Reported on 6/18/2018) 60 tablet 1     predniSONE (DELTASONE) 5 MG tablet Take daily in the morning following this schedule:  4 tablets every morning for 1 week, then take 3 tabs in am for 1 week,, then 2 tabs every am for 1 week, then1 tab in am for 1 week (Patient not taking: Reported on 6/18/2018) 199 tablet 1     Allergies   Allergen Reactions     Amoxicillin Nausea         Review of Systems:  -Constitutional: The patient is feeling generally well.   -Skin: As above in HPI. No additional skin concerns.    Physical exam:  Vitals: /65  Pulse 64  GEN: This is a well developed, well-nourished female in no acute distress, in a pleasant mood.  She presents to clinic with her parents.  SKIN: Examination of the skin was focused to the scalp, face and hands.  -Eyebrows and eyelashes continue to be normal appearing.  -Hair pull tests were overall negative  -Vellus and terminal hair growth persist in the treated areas.   -Atrophy due to previous injections on the frontal scalp.   -Positive pull test on the right post auricluar region  -No other lesions of concern on areas examined.      Impression/Plan:  1. Alopecia areata, with significantly less scalp hair loss activity    Kenalog intralesional injection procedure note: After verbal consent and discussion of risks including but not limited to atrophy, pain, and bruising, time out was performed, the patient underwent positioning and a  total of 3 cc Kenalog 10 mg/cc was injected into 30 sites on the scalp.   The patient tolerated the procedure well  and left the Dermatology clinic in good condition.     Ok to use clobetasol cream to new areas if needed    Continue to work on keeping a healthy scalp - shampoo rotating 5x weekly Clobex with on the rest of the days 2% ketoconazole    Hold Iron supplement     Start Multivitamin     Continue Vit D supplement.     Will consider working with psychologist connected to our clinic, [facility]    2. Atopic history with seasonal allergies    Continue Zyrtec daily    3. Skin atrophy, related to ILK injections affected areas not treated today    Follow-up 6 weeks, earlier for new or changing lesions.       Staff Involved:  Scribe/Staff     Scribe Disclosure:   I, Maryse Hebert, am serving as a scribe to document services personally performed by Dr. Josette Peña, based on data collection and the provider's statements to me.     I agree with the PFSH and ROS as completed by the scribe. The remainder of the encounter was performed by me and scribed by Maryse.. The scribed note accurately reflects my personal services and the medical decisions made by me.      Josette Peña MD  Professor and Chair  Department of Dermatology  [facility]                                        Pictures were placed in Pt's chart today for future reference.      Josette Peña MD

## 2018-06-18 NOTE — PROGRESS NOTES
[facility] Dermatology Note      Dermatology Problem List:  1..Alopecia areata -  -S/p ILK 10  Injections, today included   -Ketoconazole 2% shampoo QOD  -Clobetasol 0.05% shampoo, alternating with 2% ketoconazole shampoo   -Spot treatment BID with clobetasol 0.05% cream PRN   -Zyrtec daily    CC:   Chief Complaint   Patient presents with     Hair Loss     Amber is returning to discuss hair loss related to alopecia areata. She has not noticed a change since her last visit.         Encounter Date: Jun 18, 2018    History of Present Illness:  Ms. Amber Fritz is a 23 year old female who presents for follow-up of her alopecia areata.  The patient was last seen 5/3/2018. She stopped prednisone 2 weeks ago. The patient is alternating clobetasol and ketoconazole shampoo. She is taking iron and vitamin D. Hair regrowth in several  patches, but overall she feels there is still a lot of hair loss.       Past Medical History:   Patient Active Problem List   Diagnosis     AA (alopecia areata)     Dermatitis, seborrheic     Loss of hair     Autoimmune disease (H)     Past Medical History:   Diagnosis Date     AA (alopecia areata) 2/25/2018     Autoimmune disease (H) 4/1/2018     History reviewed. No pertinent surgical history.    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    Medications:  Current Outpatient Prescriptions   Medication Sig Dispense Refill     clobetasol propionate 0.05 % FOAM Apply sparingly to affected area twice daily as needed for new spots 100 g 1     clobetasol propionate 0.05 % SHAM Use three times per week. Apply to dry scalp, leave on for 10 minutes, then lather and rinse out. 1 Bottle 11     ketoconazole (NIZORAL) 2 % shampoo Apply to wet hair and massage into scalp, leave in for 2-3 minutes, then rinse 120 mL 11     triamcinolone acetonide (KENALOG) 10 MG/ML  injection See med note 5 mL 0     fluocinonide (LIDEX) 0.05 % solution Apply daily as need to scalp. (Patient not taking: Reported on 6/18/2018) 60 mL 3     predniSONE (DELTASONE) 5 MG tablet 20 mg in am for 1 week, then 15 mg in am for 1 week, then 10 mg in am for 1 week, then 5 mg in am for one week or until next visit.    (Patient not taking: Reported on 6/18/2018) 60 tablet 1     predniSONE (DELTASONE) 5 MG tablet Take daily in the morning following this schedule:  4 tablets every morning for 1 week, then take 3 tabs in am for 1 week,, then 2 tabs every am for 1 week, then1 tab in am for 1 week (Patient not taking: Reported on 6/18/2018) 199 tablet 1     Allergies   Allergen Reactions     Amoxicillin Nausea         Review of Systems:  -Constitutional: The patient is feeling generally well.   -Skin: As above in HPI. No additional skin concerns.    Physical exam:  Vitals: /65  Pulse 64  GEN: This is a well developed, well-nourished female in no acute distress, in a pleasant mood.  She presents to clinic with her parents.  SKIN: Examination of the skin was focused to the scalp, face and hands.  -Eyebrows and eyelashes continue to be normal appearing.  -Hair pull tests were overall negative  -Vellus and terminal hair growth persist in the treated areas.   -Atrophy due to previous injections on the frontal scalp.   -Positive pull test on the right post auricluar region  -No other lesions of concern on areas examined.      Impression/Plan:  1. Alopecia areata, with significantly less scalp hair loss activity    Kenalog intralesional injection procedure note: After verbal consent and discussion of risks including but not limited to atrophy, pain, and bruising, time out was performed, the patient underwent positioning and a  total of 3 cc Kenalog 10 mg/cc was injected into 30 sites on the scalp.   The patient tolerated the procedure well and left the Dermatology clinic in good condition.     Ok to use clobetasol  cream to new areas if needed    Continue to work on keeping a healthy scalp - shampoo rotating 5x weekly Clobex with on the rest of the days 2% ketoconazole    Hold Iron supplement     Start Multivitamin     Continue Vit D supplement.     Will consider working with psychologist connected to our clinic, [facility]    2. Atopic history with seasonal allergies    Continue Zyrtec daily    3. Skin atrophy, related to ILK injections affected areas not treated today    Follow-up 6 weeks, earlier for new or changing lesions.       Staff Involved:  Scribe/Staff     Scribe Disclosure:   I, Maryse Hebert, am serving as a scribe to document services personally performed by Dr. Josette Peña, based on data collection and the provider's statements to me.     I agree with the PFSH and ROS as completed by the scribe. The remainder of the encounter was performed by me and scribed by Maryse.. The scribed note accurately reflects my personal services and the medical decisions made by me.      Josette Peña MD  Professor and Chair  Department of Dermatology  [facility]

## 2018-07-02 ENCOUNTER — MYC MEDICAL ADVICE (OUTPATIENT)
Dept: DERMATOLOGY | Facility: CLINIC | Age: 23
End: 2018-07-02

## 2018-07-31 ENCOUNTER — OFFICE VISIT (OUTPATIENT)
Dept: DERMATOLOGY | Facility: CLINIC | Age: 23
End: 2018-07-31
Payer: COMMERCIAL

## 2018-07-31 VITALS — HEART RATE: 70 BPM | SYSTOLIC BLOOD PRESSURE: 97 MMHG | DIASTOLIC BLOOD PRESSURE: 60 MMHG

## 2018-07-31 DIAGNOSIS — L90.9 SKIN ATROPHY: ICD-10-CM

## 2018-07-31 DIAGNOSIS — L63.9 AA (ALOPECIA AREATA): Primary | ICD-10-CM

## 2018-07-31 DIAGNOSIS — M35.9 AUTOIMMUNE DISEASE (H): ICD-10-CM

## 2018-07-31 RX ORDER — KETOCONAZOLE 20 MG/ML
SHAMPOO TOPICAL
Qty: 120 ML | Refills: 1 | Status: SHIPPED | OUTPATIENT
Start: 2018-07-31 | End: 2019-08-14

## 2018-07-31 ASSESSMENT — PAIN SCALES - GENERAL: PAINLEVEL: NO PAIN (0)

## 2018-07-31 NOTE — PATIENT INSTRUCTIONS
1. Take a break from using clobetasol cream for 2 weeks after receiving injections. After that, use clobetasol cream 4-5x weekly to areas of hair loss.  2. Continue to shampoo using clobetasol shampoo 4-5 times weekly, and ketoconazole shampoo 1-2 times weekly.  3. Continue taking daily multivitamin.    New treatment options:  1. May start using Rogaine daily.  2. May start HairMax laser comb three times weekly 10-11 minutes each time.  (Use one or the other of the above.)    Photobiomodulation (Low Level Laser (Light) Therapy)      What is Photobiomodulation?     Photobiomodulation, also referred to as low level laser therapy,  is an emerging treatment for hair thinning in men and women, also known as pattern hair loss. The devices use light to stimulate the hair follicle.  The exact mechanism is still unknown but there is evidence for improvement with hair growth and density.      What types of devices are available?    Each patient has many different options devices depending on preference for shape, frequency of use and price point.      There is no study comparing these devices. However, most research available is on the Hairmax devices.    Below is a sample of some devices currently available. All are FDA cleared for androgenetic alopecia.    In general, the more laser lights the more expensive the device. However, this does not necessarily mean the device works better.      HairMax Lasercomb and Band   Shape Comb or Band   Clay Comb ($199-$399), Band ($499-$799)   Contact Information www.[employer]  [phone]  [phone]              LaserCap Pro Capillus 82 iGrow Theradome   Shape Hat Baseball Cap Helmet Helmet   Clay $3,000 $799 $549 $895   Contact Information [employer]  [phone]  [email or web address]."[employer] capill"[employer].com  [phone]  info@[email or web address]  [phone]  [email or web address]   [phone]         If you plan to buy a hair max device, please consider  using the following coupon code as this will give you a discount and also result in a donation from hair max to our medical students.     e      Last update:4/2/2018   e

## 2018-07-31 NOTE — PROGRESS NOTES
[facility] Dermatology Note      Dermatology Problem List:  1..Alopecia areata -  -ILK 10  Injections, today included, 3 ml; now off oral prednisone  -Ketoconazole 2% shampoo 1-2x weekly  -Clobetasol 0.05% shampoo 5x weekly   -Spot treatment BID with clobetasol 0.05% cream QOD   -multivitamin daily     Encounter Date: Jul 31, 2018    CC:  Chief Complaint   Patient presents with     Hair/Scalp Problem     Amber is returning to discuss hair loss. Small amount of improvment since last visit.         History of Present Illness:  Ms. Amber Fritz is a 23 year old female who presents as a follow-up for alopecia areata. The patient was last seen 6/18/18 when hair regrowth was noted in several patches and she received 3 ml of ILK. Since then, she has continued to shampoo daily, using clobetasol about 5x weekly, and ketoconazole about 1-2x weekly. She applies clobetasol 0.05% foam to patchy spots daily. She has started a daily multivitamin since we last saw her. She stopped taking prednisone at the beginning of June. She denies any burn, pain, itch, or numbness of the scalp. She feels her condition may be a little better, as she still pulls out a bit of hair in the shower and on her brush, but not as much. She feels she also has some areas of growth.    She is otherwise feeling well today and has not had any changes in medical history since we last saw her.     Past Medical History:   Patient Active Problem List   Diagnosis     AA (alopecia areata)     Dermatitis, seborrheic     Loss of hair     Autoimmune disease (H)     Past Medical History:   Diagnosis Date     AA (alopecia areata) 2/25/2018     Autoimmune disease (H) 4/1/2018     History reviewed. No pertinent surgical history.    Social History:  The patient works as a . She does hair and nails.    Family History:  There is no family history of hair loss.     Medications:  Current Outpatient Prescriptions   Medication Sig Dispense  Refill     clobetasol propionate 0.05 % FOAM Apply sparingly to affected area twice daily as needed for new spots 100 g 1     clobetasol propionate 0.05 % SHAM Use three times per week. Apply to dry scalp, leave on for 10 minutes, then lather and rinse out. 1 Bottle 11     ketoconazole (NIZORAL) 2 % shampoo Apply to wet hair and massage into scalp, leave in for 2-3 minutes, then rinse 120 mL 11     triamcinolone acetonide (KENALOG) 10 MG/ML injection See med note 5 mL 0     fluocinonide (LIDEX) 0.05 % solution Apply daily as need to scalp. (Patient not taking: Reported on 6/18/2018) 60 mL 3     predniSONE (DELTASONE) 5 MG tablet 20 mg in am for 1 week, then 15 mg in am for 1 week, then 10 mg in am for 1 week, then 5 mg in am for one week or until next visit.    (Patient not taking: Reported on 6/18/2018) 60 tablet 1     predniSONE (DELTASONE) 5 MG tablet Take daily in the morning following this schedule:  4 tablets every morning for 1 week, then take 3 tabs in am for 1 week,, then 2 tabs every am for 1 week, then1 tab in am for 1 week (Patient not taking: Reported on 6/18/2018) 199 tablet 1     Allergies   Allergen Reactions     Amoxicillin Nausea         Review of Systems:  -As per HPI  -Constitutional: The patient denies fatigue, fevers, chills, unintended weight loss, and night sweats.  -HEENT: Patient denies nonhealing oral sores.  -Skin: As above in HPI. No additional skin concerns.    Physical exam:  Vitals: BP 97/60  Pulse 70  GEN: This is a well developed, well-nourished female in no acute distress, in a pleasant mood.    SKIN: Focused examination of the scalp, face and hands was performed. Regrowth in various patches in frontal and parietal scalp as compared to photographs from last visit. Rectangular patch in occipital scalp smaller as compared to last visit.  -Negative pull test  -No erythema  -Mild folliculitis  -Indentation indicating atrophy in patch on R parietal scalp  -No other lesions of concern  on areas examined.     Impression/Plan:  1. Alopecia areata - improved hair growth with much regrowth in various patches in bilateral frontal and parietal scalp. Reduced size of patch in occipital scalp. Scalp healthy with very little sign of inflammation - no erythema or scale. One spot of atrophy noted in R parietal scalp.    Kenalog intralesional injection procedure note: After verbal consent and discussion of risks including but not limited to atrophy, pain, and bruising,  time out was performed, 3 total cc of Kenalog 10 mg/cc was injected into 30 sites on the scalp.  The patient tolerated the procedure well and left the Dermatology clinic in good condition.    Discussed that patient will take break from clobetasol 0.05% foam for 2 weeks post-ILK injections, then restart at reduced use of every other day to affected patches.    Continue to shampoo using clobetasol 0.05% shampoo about 5x weekly, ketoconazole 2% shampoo about 1-2x weekly.    Continue daily multivitamin    Discussed that patient may start one of 2 options to stimulate hair growth: 1) Begin rogaine 5% foam or liquid daily. 2) Begin LLLT with HairMax laser comb three times per week for 10-11 min.    Discussed that may transition treatment to [employer] (Dr. Fuentes) if she continues to see improvement.    CC Dr. Fuentes, [facility] on close of this encounter.    Follow-up in 6 weeks, earlier for new or changing lesions.     Staff Involved:  Scribed by STEPHON Trujillo for Dr. Josette Peña.        Staff Physician:  I was present with the medical student who participated in the service and in the documentation of the note. I have verified the history and personally performed the physical exam and medical decision making. I agree with the assessment and plan of care as documented in the note.  ILK injections were done together.      Josette Peña MD  Professor and Chair  Department of Dermatology  [facility]  [facility]: Phone: [phone], Fax:[phone]  [facility]: Phone: [phone], Fax: [phone]  8/26/2018

## 2018-07-31 NOTE — MR AVS SNAPSHOT
After Visit Summary   7/31/2018    Amber Fritz    MRN: [id number]           Patient Information     Date Of Birth          1995        Visit Information        Provider Department      7/31/2018 4:30 PM Josette Peña MD [facility] Dermatology        Today's Diagnoses     AA (alopecia areata)    -  1      Care Instructions    1. Take a break from using clobetasol cream for 2 weeks after receiving injections. After that, use clobetasol cream 4-5x weekly to areas of hair loss.  2. Continue to shampoo using clobetasol shampoo 4-5 times weekly, and ketoconazole shampoo 1-2 times weekly.  3. Continue taking daily multivitamin.    New treatment options:  1. May start using Rogaine daily.  2. May start HairMax laser comb three times weekly 10-11 minutes each time.  (Use one or the other of the above.)    Photobiomodulation (Low Level Laser (Light) Therapy)      What is Photobiomodulation?     Photobiomodulation, also referred to as low level laser therapy,  is an emerging treatment for hair thinning in men and women, also known as pattern hair loss. The devices use light to stimulate the hair follicle.  The exact mechanism is still unknown but there is evidence for improvement with hair growth and density.      What types of devices are available?    Each patient has many different options devices depending on preference for shape, frequency of use and price point.      There is no study comparing these devices. However, most research available is on the Hairmax devices.    Below is a sample of some devices currently available. All are FDA cleared for androgenetic alopecia.    In general, the more laser lights the more expensive the device. However, this does not necessarily mean the device works better.      HairMax Lasercomb and Band   Shape Comb or Band   Clay Comb ($199-$399), Band ($499-$799)   Contact Information [email or web address]  [phone]  [phone]              NathanielCap  Pro Capillus 82 iGrow Theradome   Shape Hat Baseball Cap Helmet Helmet   Clay $3,000 $799 $549 $895   Contact Information [email or web address]  [phone]  [email or web address]  [phone]  [email or web address]  [phone]  [email or web address]   [phone]         If you plan to buy a hair max device, please consider using the following coupon code as this will give you a discount and also result in a donation from hair max to our medical students.     e      Last update:4/2/2018   e            Follow-ups after your visit        Your next 10 appointments already scheduled     Sep 11, 2018  1:20 PM CDT   (Arrive by 1:05 PM)   RETURN HAIRLOSS with Josette Peña MD   [facility] Dermatology ([facility] [place] [facility])    [address] [place] [id number] [phone]              Who to contact     Please call your clinic at [phone] to:    Ask questions about your health    Make or cancel appointments    Discuss your medicines    Learn about your test results    Speak to your doctor            Additional Information About Your Visit        Lantern[employer]M Information     [employer] gives you secure access to your electronic health record. If you see a primary care provider, you can also send messages to your care team and make appointments. If you have questions, please call your primary care clinic.  If you do not have a primary care provider, please call [phone] and they will assist you.      [employer]an[employer]M is an electronic gateway that provides easy, online access to your medical records. With [employer], you can request a clinic appointment, read your test results, renew a prescription or communicate with your care team.     To access your existing account, please contact your [facility] or call [phone] for assistance.        Care EveryWhere ID     This is your Care EveryWhere ID. This could be used  by other organizations to access your [facility] medical records  [id number]        Your Vitals Were     Pulse                   70            Blood Pressure from Last 3 Encounters:   07/31/18 97/60   06/18/18 101/65   05/03/18 102/69    Weight from Last 3 Encounters:   03/22/18 59 kg (130 lb)              Today, you had the following     No orders found for display       Primary Care Provider Office Phone # Fax #    Rashid Rocha [phone]       [facility] [address]  [facility] [address]        Equal Access to Services     MICHA DC : Hadii aad ku hadasho Soomaali, waaxda luqadaha, qaybta kaalmada adeegyada, waxay cinthiain hayjulietan adesherley mendoza. So [facility] [phone].    ATENCIÓN: Si habla español, tiene a messer disposición servicios gratuitos de asistencia lingüística. Ll[facility] [phone].    We comply with applicable federal civil rights laws and [place] laws. We do not discriminate on the basis of race, color, national origin, age, disability, sex, sexual orientation, or gender identity.            Thank you!     Thank you for choosing [facility] DERMATOLOGY  for your care. Our goal is always to provide you with excellent care. Hearing back from our patients is one way we can continue to improve our services. Please take a few minutes to complete the written survey that you may receive in the mail after your visit with us. Thank you!             Your Updated Medication List - Protect others around you: Learn how to safely use, store and throw away your medicines at www.disposemymeds.org.          This list is accurate as of 7/31/18  5:38 PM.  Always use your most recent med list.                   Brand Name Dispense Instructions for use Diagnosis    * clobetasol propionate 0.05 % Sham     1 Bottle    Use three times per week. Apply to dry scalp, leave on for 10 minutes, then lather and rinse out.    AA (alopecia areata)       * clobetasol propionate 0.05 % Foam     100 g     Apply sparingly to affected area twice daily as needed for new spots    AA (alopecia areata)       fluocinonide 0.05 % solution    LIDEX    60 mL    Apply daily as need to scalp.    Alopecia areata       ketoconazole 2 % shampoo    NIZORAL    120 mL    Apply to wet hair and massage into scalp, leave in for 2-3 minutes, then rinse    Dermatitis, seborrheic       * predniSONE 5 MG tablet    DELTASONE    199 tablet    Take daily in the morning following this schedule: 4 tablets every morning for 1 week, then take 3 tabs in am for 1 week,, then 2 tabs every am for 1 week, then1 tab in am for 1 week    AA (alopecia areata)       * predniSONE 5 MG tablet    DELTASONE    60 tablet    20 mg in am for 1 week, then 15 mg in am for 1 week, then 10 mg in am for 1 week, then 5 mg in am for one week or until next visit.    AA (alopecia areata)       triamcinolone acetonide 10 MG/ML injection    KENALOG    5 mL    See med note    AA (alopecia areata)       * Notice:  This list has 4 medication(s) that are the same as other medications prescribed for you. Read the directions carefully, and ask your doctor or other care provider to review them with you.

## 2018-07-31 NOTE — LETTER
7/31/2018       RE: Amber Fritz  [address]  [facility] [address]     Dear Colleague,    Thank you for referring your patient, Amber Fritz, to the [facility] DERMATOLOGY at [facility]. Please see a copy of my visit note below.    [facility] Dermatology Note      Dermatology Problem List:  1..Alopecia areata -  -ILK 10  Injections, today included, 3 ml; now off oral prednisone  -Ketoconazole 2% shampoo 1-2x weekly  -Clobetasol 0.05% shampoo 5x weekly   -Spot treatment BID with clobetasol 0.05% cream QOD   -multivitamin daily     Encounter Date: Jul 31, 2018    CC:  Chief Complaint   Patient presents with     Hair/Scalp Problem     Amber is returning to discuss hair loss. Small amount of improvment since last visit.         History of Present Illness:  Ms. Amber Fritz is a 23 year old female who presents as a follow-up for alopecia areata. The patient was last seen 6/18/18 when hair regrowth was noted in several patches and she received 3 ml of ILK. Since then, she has continued to shampoo daily, using clobetasol about 5x weekly, and ketoconazole about 1-2x weekly. She applies clobetasol 0.05% foam to patchy spots daily. She has started a daily multivitamin since we last saw her. She stopped taking prednisone at the beginning of June. She denies any burn, pain, itch, or numbness of the scalp. She feels her condition may be a little better, as she still pulls out a bit of hair in the shower and on her brush, but not as much. She feels she also has some areas of growth.    She is otherwise feeling well today and has not had any changes in medical history since we last saw her.     Past Medical History:   Patient Active Problem List   Diagnosis     AA (alopecia areata)     Dermatitis, seborrheic     Loss of hair     Autoimmune disease (H)     Past Medical History:   Diagnosis Date     AA (alopecia areata) 2/25/2018     Autoimmune disease (H) 4/1/2018      History reviewed. No pertinent surgical history.    Social History:  The patient works as a . She does hair and nails.    Family History:  There is no family history of hair loss.     Medications:  Current Outpatient Prescriptions   Medication Sig Dispense Refill     clobetasol propionate 0.05 % FOAM Apply sparingly to affected area twice daily as needed for new spots 100 g 1     clobetasol propionate 0.05 % SHAM Use three times per week. Apply to dry scalp, leave on for 10 minutes, then lather and rinse out. 1 Bottle 11     ketoconazole (NIZORAL) 2 % shampoo Apply to wet hair and massage into scalp, leave in for 2-3 minutes, then rinse 120 mL 11     triamcinolone acetonide (KENALOG) 10 MG/ML injection See med note 5 mL 0     fluocinonide (LIDEX) 0.05 % solution Apply daily as need to scalp. (Patient not taking: Reported on 6/18/2018) 60 mL 3     predniSONE (DELTASONE) 5 MG tablet 20 mg in am for 1 week, then 15 mg in am for 1 week, then 10 mg in am for 1 week, then 5 mg in am for one week or until next visit.    (Patient not taking: Reported on 6/18/2018) 60 tablet 1     predniSONE (DELTASONE) 5 MG tablet Take daily in the morning following this schedule:  4 tablets every morning for 1 week, then take 3 tabs in am for 1 week,, then 2 tabs every am for 1 week, then1 tab in am for 1 week (Patient not taking: Reported on 6/18/2018) 199 tablet 1     Allergies   Allergen Reactions     Amoxicillin Nausea         Review of Systems:  -As per HPI  -Constitutional: The patient denies fatigue, fevers, chills, unintended weight loss, and night sweats.  -HEENT: Patient denies nonhealing oral sores.  -Skin: As above in HPI. No additional skin concerns.    Physical exam:  Vitals: BP 97/60  Pulse 70  GEN: This is a well developed, well-nourished female in no acute distress, in a pleasant mood.    SKIN: Focused examination of the scalp, face and hands was performed. Regrowth in various patches in frontal and  parietal scalp as compared to photographs from last visit. Rectangular patch in occipital scalp smaller as compared to last visit.  -Negative pull test  -No erythema  -Mild folliculitis  -Indentation indicating atrophy in patch on R parietal scalp  -No other lesions of concern on areas examined.     Impression/Plan:  1. Alopecia areata - improved hair growth with much regrowth in various patches in bilateral frontal and parietal scalp. Reduced size of patch in occipital scalp. Scalp healthy with very little sign of inflammation - no erythema or scale. One spot of atrophy noted in R parietal scalp.    Kenalog intralesional injection procedure note: After verbal consent and discussion of risks including but not limited to atrophy, pain, and bruising,  time out was performed, 3 total cc of Kenalog 10 mg/cc was injected into 30 sites on the scalp.  The patient tolerated the procedure well and left the Dermatology clinic in good condition.    Discussed that patient will take break from clobetasol 0.05% foam for 2 weeks post-ILK injections, then restart at reduced use of every other day to affected patches.    Continue to shampoo using clobetasol 0.05% shampoo about 5x weekly, ketoconazole 2% shampoo about 1-2x weekly.    Continue daily multivitamin    Discussed that patient may start one of 2 options to stimulate hair growth: 1) Begin rogaine 5% foam or liquid daily. 2) Begin LLLT with HairMax laser comb three times per week for 10-11 min.    Discussed that may transition treatment to [place] (Dr. Fuentes) if she continues to see improvement.    CC Dr. Fuentes, [facility] on close of this encounter.    Follow-up in 6 weeks, earlier for new or changing lesions.     Staff Involved:  Scribed by Aline Oconnell, MS2 for Dr. Josette Peña.        Staff Physician:  I was present with the medical student who participated in the service and in the documentation of the note. I have verified the history and personally performed the  physical exam and medical decision making. I agree with the assessment and plan of care as documented in the note.  ILK injections were done together.      Josette Peña MD  Professor and Chair  Department of Dermatology  [facility]: Phone: [phone], Fax:[phone]  [facility]: Phone: [phone], Fax: [phone]  8/26/2018

## 2018-07-31 NOTE — NURSING NOTE
Dermatology Rooming Note    Amber Fritz's goals for this visit include:   Chief Complaint   Patient presents with     Hair/Scalp Problem     Amber is returning to discuss hair loss. Small amount of improvment since last visit.     Parvin Stinson LPN

## 2018-08-26 PROBLEM — L90.9 SKIN ATROPHY: Status: ACTIVE | Noted: 2018-08-26

## 2018-12-31 ENCOUNTER — MEDICAL CORRESPONDENCE (OUTPATIENT)
Dept: HEALTH INFORMATION MANAGEMENT | Facility: CLINIC | Age: 23
End: 2018-12-31

## 2019-01-15 ENCOUNTER — OFFICE VISIT (OUTPATIENT)
Dept: DERMATOLOGY | Facility: CLINIC | Age: 24
End: 2019-01-15
Payer: COMMERCIAL

## 2019-01-15 VITALS — HEART RATE: 69 BPM | DIASTOLIC BLOOD PRESSURE: 61 MMHG | SYSTOLIC BLOOD PRESSURE: 93 MMHG

## 2019-01-15 DIAGNOSIS — L90.9 SKIN ATROPHY: Primary | ICD-10-CM

## 2019-01-15 DIAGNOSIS — M35.9 AUTOIMMUNE DISEASE (H): ICD-10-CM

## 2019-01-15 DIAGNOSIS — L63.9 AA (ALOPECIA AREATA): ICD-10-CM

## 2019-01-15 ASSESSMENT — PAIN SCALES - GENERAL: PAINLEVEL: NO PAIN (0)

## 2019-01-15 NOTE — LETTER
1/15/2019     RE: Amber Fritz  [address]  [facility] [address]     Dear Colleague,    Thank you for referring your patient, Amber Fritz, to the [facility] DERMATOLOGY at [facility]. Please see a copy of my visit note below.    [facility] Dermatology Note    Dermatology Problem List:  1. Alopecia areata -     Apply clobetasol foam to new spots only, then go to every other day when the lesion is not getting bigger, hold for 2 weeks after ILK     Space ILK to every 6 weeks     Continue Clobex shampoo 5x weekly and2%  ketoconazole 2x weekly     Continue multivitamin daily     Continue LLLT 3x weekly     Photos taken with patients phone of back of head for reference    Past treatment: oral prednisone     Encounter Date: Kirt 15, 2019    CC:  Chief Complaint   Patient presents with     Hair Loss     Amber is here today for a hair loss follow up      History of Present Illness:  Ms. Amber Fritz is a 23 year old female who presents as a follow-up for alopecia areata. The patient was last seen 7/2018 when she received 3cc ILK 10 mg/mL.  She is still getting new spots (most recent at the right temporal/ parietal scalp) but they are filling in. She denies itching, burning, pain, loss of eyebrows, lashes, and joint pain.    Currently she is using clobetasol foam daily, Clobex 5x weekly,2%  ketoconazole 2 days per weekly, laser comb 3 weekly, and  Is taking a multivitamin daily.     She had ILK with Dr Fuentes 4 weeks ago.     Past Medical History:   Patient Active Problem List   Diagnosis     AA (alopecia areata)     Dermatitis, seborrheic     Loss of hair     Autoimmune disease (H)     Skin atrophy     Past Medical History:   Diagnosis Date     AA (alopecia areata) 2/25/2018     Autoimmune disease (H) 4/1/2018     History reviewed. No pertinent surgical history.    Social History:  Patient reports that  has never smoked. she has never used smokeless  tobacco.    Family History:  Family History   Problem Relation Age of Onset     Melanoma Paternal Grandmother      Skin Cancer No family hx of        Medications:  Current Outpatient Medications   Medication Sig Dispense Refill     clobetasol propionate 0.05 % FOAM Apply sparingly to affected area twice daily as needed for new spots 100 g 1     clobetasol propionate 0.05 % SHAM Use three times per week. Apply to dry scalp, leave on for 10 minutes, then lather and rinse out. 1 Bottle 11     ketoconazole (NIZORAL) 2 % shampoo Use once or twice weekly. Apply to scalp, lather, let sit, then wash off in shower. 120 mL 1     ketoconazole (NIZORAL) 2 % shampoo Apply to wet hair and massage into scalp, leave in for 2-3 minutes, then rinse 120 mL 11     triamcinolone acetonide (KENALOG) 10 MG/ML injection See med note 5 mL 0     fluocinonide (LIDEX) 0.05 % solution Apply daily as need to scalp. (Patient not taking: Reported on 6/18/2018) 60 mL 3     predniSONE (DELTASONE) 5 MG tablet 20 mg in am for 1 week, then 15 mg in am for 1 week, then 10 mg in am for 1 week, then 5 mg in am for one week or until next visit.    (Patient not taking: Reported on 6/18/2018) 60 tablet 1     predniSONE (DELTASONE) 5 MG tablet Take daily in the morning following this schedule:  4 tablets every morning for 1 week, then take 3 tabs in am for 1 week,, then 2 tabs every am for 1 week, then1 tab in am for 1 week (Patient not taking: Reported on 6/18/2018) 199 tablet 1     Allergies   Allergen Reactions     Amoxicillin Nausea     Review of Systems:  -As per HPI  -Constitutional: Otherwise feeling well today, in usual state of health.  -HEENT: Patient denies nonhealing oral sores.  -Skin: As above in HPI. No additional skin concerns.    Physical exam:  Vitals: BP 93/61   Pulse 69   GEN: This is a well developed, well-nourished female in no acute distress, in a pleasant mood.    SKIN: Focused examination of the scalp, face, and nails was  performed.  - patches of alopecia, 2-4 cm in diameter scattered throughout the scalp, with mild erythema  - all patches have terminal hair regrowth, no bare patches of alopecia  - 2 cm patch of alopecia at right temporal/parietal scalp region is moderately erythematous with mild atrophy of the skin  - negative hair pull test  - eyebrows and lashes are full  - normal fingernail morphology  -No other lesions of concern on areas examined.                                                 Impression/Plan:  1. Alopecia areata - improved significantly, clinically some areas have erythema and atrophy from her ILK 4 weeks ago    Apply clobetasol foam to new spots only, then go to every other day when the lesion is not getting bigger, hold for 2 weeks after ILK     Space ILK to every 6 weeks     Continue Clobex shampoo 5x weekly and 2% ketoconazole 2x weekly     Continue multivitamin daily     Continue LLLT 3x weekly     Photos taken with patients phone of back of head for reference      Staff Involved:  Gely Wan MD  Dermatology Research Fellow     Patient was seen and examined with the clinical research fellow. I agree with the history, review of systems, physical examination, assessments and plan.    Josette Peña MD  Professor and  Chair  Department of Dermatology  [facility]    cc: Dr. Fuentes

## 2019-01-15 NOTE — NURSING NOTE
Dermatology Rooming Note    Amber Fritz's goals for this visit include:   Chief Complaint   Patient presents with     Hair Loss     Amber is here today for a hair loss follow up      KRYSTA Nguyen

## 2019-02-10 RX ORDER — CLOBETASOL PROPIONATE 0.05 G/100ML
SHAMPOO TOPICAL
Qty: 118 ML | Refills: 3 | Status: SHIPPED | OUTPATIENT
Start: 2019-02-10 | End: 2020-05-22

## 2019-07-16 ENCOUNTER — OFFICE VISIT (OUTPATIENT)
Dept: DERMATOLOGY | Facility: CLINIC | Age: 24
End: 2019-07-16
Payer: COMMERCIAL

## 2019-07-16 VITALS — HEART RATE: 61 BPM | DIASTOLIC BLOOD PRESSURE: 69 MMHG | SYSTOLIC BLOOD PRESSURE: 105 MMHG

## 2019-07-16 DIAGNOSIS — M35.9 AUTOIMMUNE DISEASE (H): ICD-10-CM

## 2019-07-16 DIAGNOSIS — L30.9 DERMATITIS: ICD-10-CM

## 2019-07-16 DIAGNOSIS — L63.9 AA (ALOPECIA AREATA): Primary | ICD-10-CM

## 2019-07-16 ASSESSMENT — PAIN SCALES - GENERAL: PAINLEVEL: NO PAIN (0)

## 2019-07-16 NOTE — LETTER
"7/16/2019       RE: Amber Fritz  [address]  [facility] [address]     Dear Colleague,    Thank you for referring your patient, Amber Fritz, to the [facility] DERMATOLOGY at [facility]. Please see a copy of my visit note below.                    Pictures were placed in Pt's chart today for future reference.      [facility] Dermatology Note      Dermatology Problem List:  1. Alopecia areata (episode began 9/17)     Current treatment: clobetasol 0.05% foam to new areas of alopecia; ILK every 4-6 weeks; Clobex shampoo 5x weekly and 2% ketoconazole 2x weekly; daily multivitamin    Past treatment: prednisone, LLLT    Encounter Date: Jul 16, 2019    CC:  Chief Complaint   Patient presents with     Derm Problem     Amber is here for a hairloss follow up, states it's \"better.\"        History of Present Illness:  Ms. Amber Fritz is a 24 year old female who presents as a follow up for alopecia areata. She was last seen in 1/15/19 where she had marked hair regrowth. Since January, Ms. Fritz reports marked improvement across her entire scalp. She denies any itching, burning, or pain. She reports that she does have possibly one new spot on the back of her scalp.     She reports that she has not used the clobetasol 0.05% foam to spots as she does not feel that she needs it and has not regularly used the photodynamic laser comb. She is using the clobex shampoo 5 times a week to the spots of involvement on her scalp and uses the ketoconazole 2% shampoo the other days. She had a few injections in June with Dr. Fuentes but is unsure where or how much injection was used. She denies any hair loss elsewhere on her body.    She is very pleased with the progress.    Past Medical History:   Patient Active Problem List   Diagnosis     AA (alopecia areata)     Dermatitis, seborrheic     Loss of hair     Autoimmune disease (H)     Skin atrophy     Past Medical History: "   Diagnosis Date     AA (alopecia areata) 2/25/2018     Autoimmune disease (H) 4/1/2018     No past surgical history on file.    Social History:  Patient reports that she has never smoked. She has never used smokeless tobacco. She works as a  in [employer].    Family History:  Family History   Problem Relation Age of Onset     Melanoma Paternal Grandmother      Skin Cancer No family hx of      Medications:  Current Outpatient Medications   Medication Sig Dispense Refill     clobetasol propionate (CLOBEX) 0.05 % external shampoo Use 5 times per week. Apply to dry scalp, leave on for 10 minutes, then lather and rinse out. 118 mL 3     clobetasol propionate 0.05 % FOAM Apply sparingly to affected area twice daily as needed for new spots 100 g 1     ketoconazole (NIZORAL) 2 % shampoo Use once or twice weekly. Apply to scalp, lather, let sit, then wash off in shower. 120 mL 1     ketoconazole (NIZORAL) 2 % shampoo Apply to wet hair and massage into scalp, leave in for 2-3 minutes, then rinse 120 mL 11     triamcinolone acetonide (KENALOG) 10 MG/ML injection See med note 5 mL 0     fluocinonide (LIDEX) 0.05 % solution Apply daily as need to scalp. (Patient not taking: Reported on 6/18/2018) 60 mL 3     predniSONE (DELTASONE) 5 MG tablet 20 mg in am for 1 week, then 15 mg in am for 1 week, then 10 mg in am for 1 week, then 5 mg in am for one week or until next visit.    (Patient not taking: Reported on 6/18/2018) 60 tablet 1     predniSONE (DELTASONE) 5 MG tablet Take daily in the morning following this schedule:  4 tablets every morning for 1 week, then take 3 tabs in am for 1 week,, then 2 tabs every am for 1 week, then1 tab in am for 1 week (Patient not taking: Reported on 6/18/2018) 199 tablet 1     Allergies   Allergen Reactions     Amoxicillin Nausea       Review of Systems:  -As per HPI  -Constitutional: Otherwise feeling well today, in usual state of health.  -Skin: As above in HPI. No additional  skin concerns.    Physical exam:  Vitals: /69 (BP Location: Right arm, Patient Position: Chair, Cuff Size: Adult Regular)   Pulse 61   GEN: This is a well developed, well-nourished female in no acute distress and in a pleasant mood.    SKIN: Focused examination of the scalp, face, and nails was performed.  -areas of significant terminal regrowth in previous areas of involvement scattered across the bitemporal/biparietal and vertex scalp  -2cm patch of alopecia at left parietal scalp with moderate follicular erythema and minimal central atrophy with surrounding terminal hair regrowth  -1cm patch of alopecia on right frontal scalp with no erythema and surrounding terminal regrowth  -Small area of alopecia on superior and post-auricular area, more pronounced L>R  -Nails normal and lashes full  -Negative pull test  -Parting width 1.1  -No other lesions of concern on areas examined.     Impression/Plan:  1. Alopecia areata -  significant improvement across entire scalp with few active lesions (episode began in 9/17) as compared to 1/19; associated with scalp dermatitis    Apply clobetasol 0.05% foam BID until stable/improvement to new spots and spots on left posterior/right frontal/post-auricular scalp. Hold for 2-3 weeks after ILK    Continue ILK every 4-6 weeks     Continue Clobex shampoo 5x weekly diffusely across scalp and 2% ketoconazole 2x weekly     Continue daily multivitamin     Photos taken today for reference      CC Dr. Fuentes, [facility]    Follow-up in 4 months.    Staff Involved:  I, King Vanegas MS4, saw and examined the patient in the presence of Dr. Peña.    Staff Physician:  I was present with the medical student who participated in the service and in the documentation of the note. I have verified the history and personally performed the physical exam and medical decision making. I agree with the assessment and plan of care as documented in the note.       Josette Peña,  MD  Professor and Chair  Department of Dermatology  [facility]: Phone: [phone], Fax:[phone]  [facility]: Phone: [phone], Fax: [phone]

## 2019-07-16 NOTE — NURSING NOTE
"Dermatology Rooming Note    Amber Fritz's goals for this visit include:   Chief Complaint   Patient presents with     Derm Problem     Amber is here for a hairloss follow up, states it's \"better.\"        Carole Dotson, THA    "

## 2019-07-18 NOTE — PROGRESS NOTES
"[facility] Dermatology Note      Dermatology Problem List:  1. Alopecia areata (episode began 9/17)     Current treatment: clobetasol 0.05% foam to new areas of alopecia; ILK every 4-6 weeks; Clobex shampoo 5x weekly and 2% ketoconazole 2x weekly; daily multivitamin    Past treatment: prednisone, LLLT    Encounter Date: Jul 16, 2019    CC:  Chief Complaint   Patient presents with     Derm Problem     Amber is here for a hairloss follow up, states it's \"better.\"        History of Present Illness:  Ms. Amber Fritz is a 24 year old female who presents as a follow up for alopecia areata. She was last seen in 1/15/19 where she had marked hair regrowth. Since January, Ms. Fritz reports marked improvement across her entire scalp. She denies any itching, burning, or pain. She reports that she does have possibly one new spot on the back of her scalp.     She reports that she has not used the clobetasol 0.05% foam to spots as she does not feel that she needs it and has not regularly used the photodynamic laser comb. She is using the clobex shampoo 5 times a week to the spots of involvement on her scalp and uses the ketoconazole 2% shampoo the other days. She had a few injections in June with Dr. Fuentes but is unsure where or how much injection was used. She denies any hair loss elsewhere on her body.    She is very pleased with the progress.    Past Medical History:   Patient Active Problem List   Diagnosis     AA (alopecia areata)     Dermatitis, seborrheic     Loss of hair     Autoimmune disease (H)     Skin atrophy     Past Medical History:   Diagnosis Date     AA (alopecia areata) 2/25/2018     Autoimmune disease (H) 4/1/2018     No past surgical history on file.    Social History:  Patient reports that she has never smoked. She has never used smokeless tobacco. She works as a  in [employer].    Family History:  Family History   Problem Relation Age of Onset     Melanoma Paternal " Grandmother      Skin Cancer No family hx of      Medications:  Current Outpatient Medications   Medication Sig Dispense Refill     clobetasol propionate (CLOBEX) 0.05 % external shampoo Use 5 times per week. Apply to dry scalp, leave on for 10 minutes, then lather and rinse out. 118 mL 3     clobetasol propionate 0.05 % FOAM Apply sparingly to affected area twice daily as needed for new spots 100 g 1     ketoconazole (NIZORAL) 2 % shampoo Use once or twice weekly. Apply to scalp, lather, let sit, then wash off in shower. 120 mL 1     ketoconazole (NIZORAL) 2 % shampoo Apply to wet hair and massage into scalp, leave in for 2-3 minutes, then rinse 120 mL 11     triamcinolone acetonide (KENALOG) 10 MG/ML injection See med note 5 mL 0     fluocinonide (LIDEX) 0.05 % solution Apply daily as need to scalp. (Patient not taking: Reported on 6/18/2018) 60 mL 3     predniSONE (DELTASONE) 5 MG tablet 20 mg in am for 1 week, then 15 mg in am for 1 week, then 10 mg in am for 1 week, then 5 mg in am for one week or until next visit.    (Patient not taking: Reported on 6/18/2018) 60 tablet 1     predniSONE (DELTASONE) 5 MG tablet Take daily in the morning following this schedule:  4 tablets every morning for 1 week, then take 3 tabs in am for 1 week,, then 2 tabs every am for 1 week, then1 tab in am for 1 week (Patient not taking: Reported on 6/18/2018) 199 tablet 1     Allergies   Allergen Reactions     Amoxicillin Nausea       Review of Systems:  -As per HPI  -Constitutional: Otherwise feeling well today, in usual state of health.  -Skin: As above in HPI. No additional skin concerns.    Physical exam:  Vitals: /69 (BP Location: Right arm, Patient Position: Chair, Cuff Size: Adult Regular)   Pulse 61   GEN: This is a well developed, well-nourished female in no acute distress and in a pleasant mood.    SKIN: Focused examination of the scalp, face, and nails was performed.  -areas of significant terminal regrowth in  previous areas of involvement scattered across the bitemporal/biparietal and vertex scalp  -2cm patch of alopecia at left parietal scalp with moderate follicular erythema and minimal central atrophy with surrounding terminal hair regrowth  -1cm patch of alopecia on right frontal scalp with no erythema and surrounding terminal regrowth  -Small area of alopecia on superior and post-auricular area, more pronounced L>R  -Nails normal and lashes full  -Negative pull test  -Parting width 1.1  -No other lesions of concern on areas examined.     Impression/Plan:  1. Alopecia areata - significant improvement across entire scalp with few active lesions (episode began in 9/17) as compared to 1/19; associated with scalp dermatitis    Apply clobetasol 0.05% foam BID until stable/improvement to new spots and spots on left posterior/right frontal/post-auricular scalp. Hold for 2-3 weeks after ILK    Continue ILK every 4-6 weeks     Continue Clobex shampoo 5x weekly diffusely across scalp and 2% ketoconazole 2x weekly     Continue daily multivitamin     Photos taken today for reference      CC Dr. Fuentes, [facility]    Follow-up in 4 months.    Staff Involved:  I, King Vanegas MS4, saw and examined the patient in the presence of Dr. Peña.    Staff Physician:  I was present with the medical student who participated in the service and in the documentation of the note. I have verified the history and personally performed the physical exam and medical decision making. I agree with the assessment and plan of care as documented in the note.       Josette Peña MD  Professor and Chair  Department of Dermatology  [facility]: Phone: [phone], Fax:[phone]  [facility]: Phone: [phone], Fax: [phone]

## 2019-08-14 DIAGNOSIS — L63.9 AA (ALOPECIA AREATA): ICD-10-CM

## 2019-08-15 RX ORDER — KETOCONAZOLE 20 MG/ML
SHAMPOO TOPICAL
Qty: 120 ML | Refills: 11 | Status: SHIPPED | OUTPATIENT
Start: 2019-08-15

## 2019-11-19 ENCOUNTER — OFFICE VISIT (OUTPATIENT)
Dept: DERMATOLOGY | Facility: CLINIC | Age: 24
End: 2019-11-19
Payer: COMMERCIAL

## 2019-11-19 VITALS — HEART RATE: 74 BPM | DIASTOLIC BLOOD PRESSURE: 68 MMHG | SYSTOLIC BLOOD PRESSURE: 107 MMHG

## 2019-11-19 DIAGNOSIS — L63.9 AA (ALOPECIA AREATA): Primary | ICD-10-CM

## 2019-11-19 DIAGNOSIS — M35.9 AUTOIMMUNE DISEASE (H): ICD-10-CM

## 2019-11-19 NOTE — NURSING NOTE
Dermatology Rooming Note    Amber Fritz's goals for this visit include:   Chief Complaint   Patient presents with     Derm Problem     Amber is here for a hairloss follow up, states no change.         Carole Dotson LPN

## 2019-11-19 NOTE — NURSING NOTE
Drug Administration Record    Prior to injection, verified patient identity using patient's name and date of birth.  Due to injection administration, patient instructed to remain in clinic for 15 minutes  afterwards, and to report any adverse reaction to me immediately.    Drug Name: triamcinolone acetonide(kenalog)  Dose: 1mL of triamcinolone 10mg/mL, 10mg dose  Route administered: ID  NDC #: [id number]: Kenalog-10 (3965-3248-28)  Amount of waste(mL):4  Reason for waste: Multi dose vial    LOT #: [id number]  SITE: body  : [employer]  EXPIRATION DATE: APR2021

## 2019-11-19 NOTE — PROGRESS NOTES
[facility] Dermatology Note      Dermatology Problem List:  1. Alopecia areata (episode began 9/17)     Current treatment: clobetasol 0.05% foam to new areas of alopecia; ILK every 4-6 weeks; Clobex shampoo 5x weekly and 2% ketoconazole 2x weekly; daily multivitamin    Past treatment: prednisone, LLLT    Encounter Date: Nov 19, 2019    CC:  Chief Complaint   Patient presents with     Derm Problem     Amber is here for a hairloss follow up, states no change.         History of Present Illness:  Ms. Amber Fritz is a 24 year old female who presents as a follow up for alopecia areata. She was last seen in 7/16/19 when she was stable.    She also believes she is stable today with minimal change.  She is using the clobex shampoo 5 times a week to the spots of involvement on her scalp and uses the ketoconazole 2% shampoo the other days. She has seen dermatologist Dr. Fuentes since our last visit and no changes were made at that time.  She denies any hair loss elsewhere on her body.  Denies scalp tingling or pruritus.  Similar alopecic patches on right frontal part (which is more visible) and on occipital scalp (which is difficult to find).      Past Medical History:   Patient Active Problem List   Diagnosis     AA (alopecia areata)     Dermatitis, seborrheic     Loss of hair     Autoimmune disease (H)     Skin atrophy     Past Medical History:   Diagnosis Date     AA (alopecia areata) 2/25/2018     Autoimmune disease (H) 4/1/2018     No past surgical history on file.    Social History:  Patient reports that she has never smoked. She has never used smokeless tobacco. She works as a  in [employer].    Family History:  Family History   Problem Relation Age of Onset     Melanoma Paternal Grandmother      Skin Cancer No family hx of      Medications:  Current Outpatient Medications   Medication Sig Dispense Refill     clobetasol propionate (CLOBEX) 0.05 % external shampoo Use 5 times per  week. Apply to dry scalp, leave on for 10 minutes, then lather and rinse out. 118 mL 3     clobetasol propionate 0.05 % FOAM Apply sparingly to affected area twice daily as needed for new spots 100 g 1     ketoconazole (NIZORAL) 2 % external shampoo USE ONCE/TWICE PER WEEK. LATHER, LET SIT THEN RINSE OFF IN SHOWER 120 mL 11     ketoconazole (NIZORAL) 2 % shampoo Apply to wet hair and massage into scalp, leave in for 2-3 minutes, then rinse 120 mL 11     triamcinolone acetonide (KENALOG) 10 MG/ML injection See med note 5 mL 0     fluocinonide (LIDEX) 0.05 % solution Apply daily as need to scalp. (Patient not taking: Reported on 6/18/2018) 60 mL 3     predniSONE (DELTASONE) 5 MG tablet 20 mg in am for 1 week, then 15 mg in am for 1 week, then 10 mg in am for 1 week, then 5 mg in am for one week or until next visit.    (Patient not taking: Reported on 6/18/2018) 60 tablet 1     predniSONE (DELTASONE) 5 MG tablet Take daily in the morning following this schedule:  4 tablets every morning for 1 week, then take 3 tabs in am for 1 week,, then 2 tabs every am for 1 week, then1 tab in am for 1 week (Patient not taking: Reported on 6/18/2018) 199 tablet 1     Allergies   Allergen Reactions     Amoxicillin Nausea       Review of Systems:  -As per HPI  -Constitutional: Otherwise feeling well today, in usual state of health.  -Skin: As above in HPI. No additional skin concerns.    Physical exam:  Vitals: /68 (BP Location: Right arm, Patient Position: Chair, Cuff Size: Adult Regular)   Pulse 74   GEN: This is a well developed, well-nourished female in no acute distress and in a pleasant mood.    SKIN: Focused examination of the scalp, face, and nails was performed.  -7x13mm patch of alopecia at midline occipital scalp with minimal central hair regrowth, negative hair pull test  -[id number] patch of alopecia on right frontal scalp with good central hair regrowth, but one exclamation point hair, negative hair pull test  -slight  thinning on left frontoparietal scalp  -Nails normal and lashes full  -Part width 1.1  -No other lesions of concern on areas examined.                               Impression/Plan:  1. Alopecia areata - significant improvement across entire scalp with few active lesions (episode began in 9/17).  Fairly stable today since last visit with 2 notable persistent patches    Apply clobetasol 0.05% foam BID until stable/improvement to new spots. Hold for 2-3 weeks after ILK    ILK 10mg/mL x 1mL injected today.  0.3mL in the focal areas on the part and occipital scalp, and the remainder to areas of thinning hair on the left scalp. Ten sites were injected, 0.1 ml per site.    Alternate Clobex and ketoconazole shampoos    Continue daily multivitamin     Photos taken today for reference      CC Dr. Fuentes, [facility]    Follow-up in 4 months.    Staff Involved:  Resident/Staff    Kamari Everett MD  PGY-4 Dermatology  (p) [phone]      Patient was seen and examined with the dermatology resident. I agree with the history, review of systems, physical examination, assessments and plan. ILK injections were done together.    Josette Peña MD  Professor and  Chair  Department of Dermatology  [facility]

## 2020-03-11 ENCOUNTER — HEALTH MAINTENANCE LETTER (OUTPATIENT)
Age: 25
End: 2020-03-11

## 2020-05-12 DIAGNOSIS — L63.9 AA (ALOPECIA AREATA): ICD-10-CM

## 2020-05-14 RX ORDER — CLOBETASOL PROPIONATE 0.05 G/100ML
SHAMPOO TOPICAL
Qty: 118 ML | Refills: 3 | OUTPATIENT
Start: 2020-05-14

## 2020-05-14 NOTE — TELEPHONE ENCOUNTER
Last Clinic Visit:  11/19/19  NV : NONE  Scheduling has been notified to contact the pt for appointment

## 2020-05-22 DIAGNOSIS — L63.9 AA (ALOPECIA AREATA): ICD-10-CM

## 2020-05-22 RX ORDER — CLOBETASOL PROPIONATE 0.05 G/100ML
SHAMPOO TOPICAL
Qty: 118 ML | Refills: 3 | Status: SHIPPED | OUTPATIENT
Start: 2020-05-22 | End: 2021-04-06

## 2020-05-22 NOTE — TELEPHONE ENCOUNTER
Can you refill until appt in Oct     LOV:11/19/19  1. Alopecia areata - significant improvement across entire scalp with few active lesions (episode began in 9/17).  Fairly stable today since last visit with 2 notable persistent patches    Apply clobetasol 0.05% foam BID until stable/improvement to new spots. Hold for 2-3 weeks after ILK    ILK 10mg/mL x 1mL injected today.  0.3mL in the focal areas on the part and occipital scalp, and the remainder to areas of thinning hair on the left scalp. Ten sites were injected, 0.1 ml per site.    Alternate Clobex and ketoconazole shampoos    Continue daily multivitamin     Photos taken today for reference        CC Dr. Fuentes, [facility]     Follow-up in 4 months.

## 2020-05-22 NOTE — TELEPHONE ENCOUNTER
Received refill request for clobetasol shampoo as the resident on call. Reviewed patient's chart and attached communication. Patient last seen 11/2019 for alopecia areata. RTC scheduled now in Oct (delayed due to COVID). After reviewing the medication list and assessment and plan from last visit, the refill request was accepted.    King Smith MD  Dermatology Resident, PGY4

## 2020-10-06 ENCOUNTER — OFFICE VISIT (OUTPATIENT)
Dept: DERMATOLOGY | Facility: CLINIC | Age: 25
End: 2020-10-06
Payer: COMMERCIAL

## 2020-10-06 DIAGNOSIS — L63.9 AA (ALOPECIA AREATA): Primary | ICD-10-CM

## 2020-10-06 DIAGNOSIS — L90.9 SKIN ATROPHY: ICD-10-CM

## 2020-10-06 PROCEDURE — 99213 OFFICE O/P EST LOW 20 MIN: CPT | Performed by: DERMATOLOGY

## 2020-10-06 ASSESSMENT — PAIN SCALES - GENERAL: PAINLEVEL: NO PAIN (0)

## 2020-10-06 NOTE — PROGRESS NOTES
[facility] Dermatology Note      Dermatology Problem List:  1. Alopecia areata (episode began 9/17)     Current treatment: clobetasol 0.05% foam to new areas of alopecia; ILK every 4-6 weeks as needed; Clobex shampoo 5x weekly and 2% ketoconazole 2x weekly; daily multivitamin    Past treatment: prednisone, LLLT    Encounter Date: Oct 6, 2020    CC:  Chief Complaint   Patient presents with     Hair Loss     Hair loss follow up          History of Present Illness:  Ms. Amber Fritz is a 25 year old female who presents as a follow-up for alopecia areata. The patient was last seen 11/19/2019 when her disease was stable.     The patient reports overall improvement. She uses clobetasol shampoo 5x/week and ketoconazole 2% shampoo 2x/week. She has had a few new spots on scalp which improved with clobetasol foam. She last received ILK injection in June. She has noticed depressed areas at 3 of the injection sites with 2 developing after her most recent injections. She has noticed the first depressed area improving with time. She has not had any burning, pruritus, pain, or bleeding on her scalp. She also receives care from the dermatologist Dr. Kathi Fuentes of the [facility].     Past Medical History:   Patient Active Problem List   Diagnosis     AA (alopecia areata)     Dermatitis, seborrheic     Loss of hair     Autoimmune disease (H)     Skin atrophy     Past Medical History:   Diagnosis Date     AA (alopecia areata) 2/25/2018     Autoimmune disease (H) 4/1/2018     History reviewed. No pertinent surgical history.    Social History:  Patient reports that she has never smoked. She has never used smokeless tobacco.    Family History:  Family History   Problem Relation Age of Onset     Melanoma Paternal Grandmother      Skin Cancer No family hx of        Medications:  Current Outpatient Medications   Medication Sig Dispense Refill     clobetasol propionate (CLOBEX) 0.05 % external shampoo Use 5 times  per week. Apply to dry scalp, leave on for 10 minutes, then lather and rinse out. 118 mL 3     clobetasol propionate 0.05 % FOAM Apply sparingly to affected area twice daily as needed for new spots 100 g 1     ketoconazole (NIZORAL) 2 % external shampoo USE ONCE/TWICE PER WEEK. LATHER, LET SIT THEN RINSE OFF IN SHOWER 120 mL 11     ketoconazole (NIZORAL) 2 % shampoo Apply to wet hair and massage into scalp, leave in for 2-3 minutes, then rinse 120 mL 11     triamcinolone acetonide (KENALOG) 10 MG/ML injection See med note 5 mL 0     fluocinonide (LIDEX) 0.05 % solution Apply daily as need to scalp. (Patient not taking: Reported on 6/18/2018) 60 mL 3     predniSONE (DELTASONE) 5 MG tablet 20 mg in am for 1 week, then 15 mg in am for 1 week, then 10 mg in am for 1 week, then 5 mg in am for one week or until next visit.    (Patient not taking: Reported on 6/18/2018) 60 tablet 1     predniSONE (DELTASONE) 5 MG tablet Take daily in the morning following this schedule:  4 tablets every morning for 1 week, then take 3 tabs in am for 1 week,, then 2 tabs every am for 1 week, then1 tab in am for 1 week (Patient not taking: Reported on 6/18/2018) 199 tablet 1     Allergies   Allergen Reactions     Amoxicillin Nausea         Review of Systems:  -As per HPI  -Constitutional: Otherwise feeling well today, in usual state of health.  -HEENT: Patient denies nonhealing oral sores.  -Skin: As above in HPI. No additional skin concerns.    Physical exam:  Vitals: There were no vitals taken for this visit.  GEN: This is a well developed, well-nourished female in no acute distress, in a pleasant mood.    SKIN: Focused examination of the face and scalp was performed.  -Gonzales skin type: II  -Depressed areas  at the left posterior scalp, left anterior scalp, and right posterior scalp (not as pronounced).  -No other lesions of concern on areas examined.   -Some areas of decreased hair density on the right scalp compared to the left  -  Negative hair pull tests  -Compared to previous photos, hair has improved    Impression/Plan:  1. Alopecia areata  Improvement with few active areas. Three areas of skin atrophy at scalp with one almost resolved.    Continue with clobetasol shampoo 5x/week and clobetasol 0.05% foam to newly affected areas until stable. Discussed to not have prolonged use of clobetasol 0.05% foam, especially on areas of skin atrophy. Recommended to rely more on clobetasol shampoo - short contact, global therapy - for overall control of her disease..    Continue ketoconazole 2% shampoo    CC Dr. NAHUM Fuentes, [facility].    Follow-up in 5 months, earlier for new or changing lesions.     Staff Involved:  I, Ben Galvin (MS4), saw and examined the patient in the presence of Dr. Peña.    Staff Physician:  I was present with the medical student who participated in the service and in the documentation of the note. I have verified the history and personally performed the physical exam and medical decision making. I agree with the assessment and plan of care as documented in the note.       Josette Peña MD  Professor and Chair  Department of Dermatology  [facility]: Phone: [phone], Fax:[phone]  [facility]: Phone: [phone], Fax: [phone]

## 2020-10-06 NOTE — LETTER
10/6/2020       RE: Amber Fritz  [address]  [place] [address]     Dear Colleague,    Thank you for referring your patient, Amber Fritz, to the [facility] DERMATOLOGY [facility] [place] at [facility]. Please see a copy of my visit note below.    [facility] Dermatology Note      Dermatology Problem List:  1. Alopecia areata (episode began 9/17)     Current treatment: clobetasol 0.05% foam to new areas of alopecia; ILK every 4-6 weeks as needed; Clobex shampoo 5x weekly and 2% ketoconazole 2x weekly; daily multivitamin    Past treatment: prednisone, LLLT    Encounter Date: Oct 6, 2020    CC:  Chief Complaint   Patient presents with     Hair Loss     Hair loss follow up          History of Present Illness:  Ms. Amber Fritz is a 25 year old female who presents as a follow-up for alopecia areata. The patient was last seen 11/19/2019 when her disease was stable.     The patient reports overall improvement. She uses clobetasol shampoo 5x/week and ketoconazole 2% shampoo 2x/week. She has had a few new spots on scalp which improved with clobetasol foam. She last received ILK injection in June. She has noticed depressed areas at 3 of the injection sites with 2 developing after her most recent injections. She has noticed the first depressed area improving with time. She has not had any burning, pruritus, pain, or bleeding on her scalp. She also receives care from the dermatologist Dr. Kathi Fuentes of the [facility].     Past Medical History:   Patient Active Problem List   Diagnosis     AA (alopecia areata)     Dermatitis, seborrheic     Loss of hair     Autoimmune disease (H)     Skin atrophy     Past Medical History:   Diagnosis Date     AA (alopecia areata) 2/25/2018     Autoimmune disease (H) 4/1/2018     History reviewed. No pertinent surgical history.    Social History:  Patient reports that she has never smoked. She has never used smokeless  tobacco.    Family History:  Family History   Problem Relation Age of Onset     Melanoma Paternal Grandmother      Skin Cancer No family hx of        Medications:  Current Outpatient Medications   Medication Sig Dispense Refill     clobetasol propionate (CLOBEX) 0.05 % external shampoo Use 5 times per week. Apply to dry scalp, leave on for 10 minutes, then lather and rinse out. 118 mL 3     clobetasol propionate 0.05 % FOAM Apply sparingly to affected area twice daily as needed for new spots 100 g 1     ketoconazole (NIZORAL) 2 % external shampoo USE ONCE/TWICE PER WEEK. LATHER, LET SIT THEN RINSE OFF IN SHOWER 120 mL 11     ketoconazole (NIZORAL) 2 % shampoo Apply to wet hair and massage into scalp, leave in for 2-3 minutes, then rinse 120 mL 11     triamcinolone acetonide (KENALOG) 10 MG/ML injection See med note 5 mL 0     fluocinonide (LIDEX) 0.05 % solution Apply daily as need to scalp. (Patient not taking: Reported on 6/18/2018) 60 mL 3     predniSONE (DELTASONE) 5 MG tablet 20 mg in am for 1 week, then 15 mg in am for 1 week, then 10 mg in am for 1 week, then 5 mg in am for one week or until next visit.    (Patient not taking: Reported on 6/18/2018) 60 tablet 1     predniSONE (DELTASONE) 5 MG tablet Take daily in the morning following this schedule:  4 tablets every morning for 1 week, then take 3 tabs in am for 1 week,, then 2 tabs every am for 1 week, then1 tab in am for 1 week (Patient not taking: Reported on 6/18/2018) 199 tablet 1     Allergies   Allergen Reactions     Amoxicillin Nausea         Review of Systems:  -As per HPI  -Constitutional: Otherwise feeling well today, in usual state of health.  -HEENT: Patient denies nonhealing oral sores.  -Skin: As above in HPI. No additional skin concerns.    Physical exam:  Vitals: There were no vitals taken for this visit.  GEN: This is a well developed, well-nourished female in no acute distress, in a pleasant mood.    SKIN: Focused examination of the face  and scalp was performed.  -Gonzales skin type: II  -Depressed areas  at the left posterior scalp, left anterior scalp, and right posterior scalp (not as pronounced).  -No other lesions of concern on areas examined.   -Some areas of decreased hair density on the right scalp compared to the left  - Negative hair pull tests  -Compared to previous photos, hair has improved    Impression/Plan:  1. Alopecia areata  Improvement with few active areas. Three areas of skin atrophy at scalp with one almost resolved.    Continue with clobetasol shampoo 5x/week and clobetasol 0.05% foam to newly affected areas until stable. Discussed to not have prolonged use of clobetasol 0.05% foam, especially on areas of skin atrophy. Recommended to rely more on clobetasol shampoo - short contact, global therapy - for overall control of her disease..    Continue ketoconazole 2% shampoo    CC Dr. NAHUM Fuentes, [facility].    Follow-up in 5 months, earlier for new or changing lesions.     Staff Involved:  I, Ben Galvin (MS4), saw and examined the patient in the presence of Dr. Peña.    Staff Physician:  I was present with the medical student who participated in the service and in the documentation of the note. I have verified the history and personally performed the physical exam and medical decision making. I agree with the assessment and plan of care as documented in the note.       Josette Peña MD  Professor and Chair  Department of Dermatology  [facility]: Phone: [phone], Fax:[phone]  [facility]: Phone: [phone], Fax: [phone]

## 2020-10-06 NOTE — NURSING NOTE
Dermatology Rooming Note    Amber Fritz's goals for this visit include:   Chief Complaint   Patient presents with     Hair Loss     Hair loss follow up      KRYSTA Nguyen

## 2020-10-06 NOTE — PATIENT INSTRUCTIONS
Can put more of the clobetasol shampoo on spots that have less hair. Try the shampoo over the solution for areas that have sunken in a little.    Since your right side has hair that is a little thinner, you can focus on using that area with the shampoo.

## 2021-01-03 ENCOUNTER — HEALTH MAINTENANCE LETTER (OUTPATIENT)
Age: 26
End: 2021-01-03

## 2021-04-06 ENCOUNTER — OFFICE VISIT (OUTPATIENT)
Dept: DERMATOLOGY | Facility: CLINIC | Age: 26
End: 2021-04-06
Payer: COMMERCIAL

## 2021-04-06 DIAGNOSIS — L63.9 AA (ALOPECIA AREATA): Primary | ICD-10-CM

## 2021-04-06 DIAGNOSIS — M35.9 AUTOIMMUNE DISEASE (H): ICD-10-CM

## 2021-04-06 DIAGNOSIS — L90.9 SKIN ATROPHY: ICD-10-CM

## 2021-04-06 PROCEDURE — 11901 INJECT SKIN LESIONS >7: CPT | Performed by: DERMATOLOGY

## 2021-04-06 PROCEDURE — 99213 OFFICE O/P EST LOW 20 MIN: CPT | Mod: 25 | Performed by: DERMATOLOGY

## 2021-04-06 RX ORDER — SERTRALINE HYDROCHLORIDE 100 MG/1
100 TABLET, FILM COATED ORAL
COMMUNITY
Start: 2021-03-23

## 2021-04-06 RX ORDER — CLOBETASOL PROPIONATE 0.05 G/100ML
SHAMPOO TOPICAL
Qty: 118 ML | Refills: 3 | Status: SHIPPED | OUTPATIENT
Start: 2021-04-06

## 2021-04-06 ASSESSMENT — PAIN SCALES - GENERAL: PAINLEVEL: NO PAIN (0)

## 2021-04-06 NOTE — LETTER
2021       RE: Amber Fritz  [address] [place] [address]     Dear Colleague,    Thank you for referring your patient, Amber Fritz, to the [facility] DERMATOLOGY CLINIC [place] at [facility]. Please see a copy of my visit note below.    Drug Administration Record    Prior to injection, verified patient identity using patient's name and date of birth.  Due to injection administration, patient instructed to remain in clinic for 15 minutes  afterwards, and to report any adverse reaction to me immediately.    Drug Name: triamcinolone acetonide(kenalog)  Dose: 2 mL of triamcinolone 10mg/mL, 20 mg dose  Route administered: ID  NDC #: Kenalog-10 (3695-4876-34)  Amount of waste(mL): 3  Reason for waste: Multi dose vial    LOT #: [id number]  SITE: Scalp  : [employer]  EXPIRATION DATE: 2022    [facility] Dermatology Note    Dermatology Problem List:  1. Alopecia areata (episode began )   - Current tx: clobetasol 0.05% foam to new areas of alopecia, ILK every 4-6 weeks as needed, Clobex shampoo 5x weekly and 2% ketoconazole 2x weekly, Sertraline, daily multivitamin  - Past tx: prednisone, LLLT, caitlyn supplement    2. Skin atrophy - related to ILK injections; sites avoided        Encounter Date: 2021    CC:   Chief Complaint   Patient presents with     RECHECK     pt states she is here for 6 months hairlloss follow up, pt states he has some new spots of hair loss       History of Present Illness:  Ms. Amber Fritz is a 26 year old female who presents as a follow-up patient for alopecia areata. Ms. Fritz was last seen 10/06/2020 when she reported overall improvement. At this past visit she reported using clobetasol shampoo 5x/week and ketoconazole 2% shampoo 2x/week. She had a few new spots on scalp which improved with clobetasol foam. She previously received ILK injections in . She noticed  depressed areas at 3 of the injection sites with 2 developing after her most recent injections. She noticed the first depressed area improving with time. She has not had any burning, pruritus, pain, or bleeding on her scalp. She also received care from the dermatologist Dr. Fuentes of the [facility].     Today, Ms. Fritz reports that she has been staying healthy and does not have a symptomatic scalp. She has continued using clobetasol shampoo 4x/week and ketoconazole 2x/week. She recently began taking Sertraline for anxiety. She mentioned that she briefly switched out her multivitamin for a prenatal supplement, although she switched back when she realized there was no iron in the ;prenatal supplement. No other concerns today and in general state of health.    Past Medical History:   Patient Active Problem List   Diagnosis     AA (alopecia areata)     Dermatitis, seborrheic     Loss of hair     Autoimmune disease (H)     Skin atrophy     Past Medical History:   Diagnosis Date     AA (alopecia areata) 2/25/2018     Autoimmune disease (H) 4/1/2018     No past surgical history on file.    Social History:   reports that she has never smoked. She has never used smokeless tobacco.    Family History:  Family History   Problem Relation Age of Onset     Melanoma Paternal Grandmother      Skin Cancer No family hx of        Medications:  Current Outpatient Medications   Medication Sig Dispense Refill     clobetasol propionate (CLOBEX) 0.05 % external shampoo Use 5 times per week. Apply to dry scalp, leave on for 10 minutes, then lather and rinse out. 118 mL 3     clobetasol propionate 0.05 % FOAM Apply sparingly to affected area twice daily as needed for new spots 100 g 1     ketoconazole (NIZORAL) 2 % external shampoo USE ONCE/TWICE PER WEEK. LATHER, LET SIT THEN RINSE OFF IN SHOWER 120 mL 11     ketoconazole (NIZORAL) 2 % shampoo Apply to wet hair and massage into scalp, leave in for 2-3 minutes, then rinse 120 mL 11      sertraline (ZOLOFT) 100 MG tablet Take 100 mg by mouth       triamcinolone acetonide (KENALOG) 10 MG/ML injection See med note 5 mL 0     fluocinonide (LIDEX) 0.05 % solution Apply daily as need to scalp. (Patient not taking: Reported on 4/6/2021) 60 mL 3     predniSONE (DELTASONE) 5 MG tablet 20 mg in am for 1 week, then 15 mg in am for 1 week, then 10 mg in am for 1 week, then 5 mg in am for one week or until next visit.    (Patient not taking: Reported on 4/6/2021) 60 tablet 1     predniSONE (DELTASONE) 5 MG tablet Take daily in the morning following this schedule:  4 tablets every morning for 1 week, then take 3 tabs in am for 1 week,, then 2 tabs every am for 1 week, then1 tab in am for 1 week (Patient not taking: Reported on 4/6/2021) 199 tablet 1      Allergies   Allergen Reactions     Amoxicillin Nausea       Review of Systems:  -Constitutional: Otherwise doing well.   -Skin: As above in HPI. No additional skin concerns.    Physical exam:  Vitals: There were no vitals taken for this visit.  GEN: Well developed, well-nourished, in no acute distress, in a pleasant mood.    SKIN: A focused examination of the face and scalp was performed.  Gonzales type: II  - New AA spots:   - One dime sized spot: Left mid occipital scalp   - One 1.5-2.5 cm spot: Mid occipital scalp   - One 1-2 cm spot: Frontal midscalp   - Negative hair pull tests  - Decreased density and follicular accentuation on the right scalp was noted  - Not able to compare to prior photographs due to abundance of hair in past photos..    Impression/Plan:  1. Alopecia areata  - Continue current tx: Clobetasol 0.05% foam to new areas of alopecia, ILK every 4-6 weeks as needed, Clobex shampoo 5x weekly and 2% ketoconazole 2x weekly, Sertraline, and daily multivitamin  - Review labs at upcoming visit    - Labs: Vitamin D Deficiency, TSH with free T4 reflex, Zinc, Thyroid peroxidase antibody, Ferritin, CBC with platelets diff, Iron and iron binding  capacity.    - 2 mL ILK 10 today  - After verbal consent and discussion of risks including but not limited to atrophy, pain, and bruising, cleansing with isopropyl alcohol, time out was performed, 2 total cc of Kenalog 10 mg/cc was injected into 20 sites on the scalp. The patient tolerated the procedure well and left the Dermatology Clinic in good condition.    Follow-up in 3-4 months, earlier for changing or concerning lesions.    Dr. Peña staffed the patient.    Staff Involved:  Scribe/Fellow (Mariam Cantu)/Staff(as above)    Scribe Disclosure  I, Mariam Cantu, am serving as a scribe to document services personally performed by Dr. Josette Peña MD, based on data collection and the provider's statements to me.      Provider Disclosure:   The documentation recorded by the scribe accurately reflects the services I personally performed and the decisions made by me. ILK injections were done by me.    Josette Peña MD  Professor and Chair  Department of Dermatology  [facility]: Phone: [phone], Fax:[phone]  [facility]: Phone: [phone], Fax: [phone]

## 2021-04-06 NOTE — PROGRESS NOTES
Drug Administration Record    Prior to injection, verified patient identity using patient's name and date of birth.  Due to injection administration, patient instructed to remain in clinic for 15 minutes  afterwards, and to report any adverse reaction to me immediately.    Drug Name: triamcinolone acetonide(kenalog)  Dose: 2 mL of triamcinolone 10mg/mL, 20 mg dose  Route administered: ID  NDC #: Kenalog-10 ([id number])  Amount of waste(mL): 3  Reason for waste: Multi dose vial    LOT #: [id number]  SITE: Scalp  : [employer]  EXPIRATION DATE: JUN 2022

## 2021-04-07 NOTE — PROGRESS NOTES
[facility] Dermatology Note    Dermatology Problem List:  1. Alopecia areata (episode began )   - Current tx: clobetasol 0.05% foam to new areas of alopecia, ILK every 4-6 weeks as needed, Clobex shampoo 5x weekly and 2% ketoconazole 2x weekly, Sertraline, daily multivitamin  - Past tx: prednisone, LLLT,  supplement    2. Skin atrophy - related to ILK injections; sites avoided        Encounter Date: 2021    CC:   Chief Complaint   Patient presents with     RECHECK     pt states she is here for 6 months hairlloss follow up, pt states he has some new spots of hair loss       History of Present Illness:  Ms. Amber Fritz is a 26 year old female who presents as a follow-up patient for alopecia areata. Ms. Fritz was last seen 10/06/2020 when she reported overall improvement. At this past visit she reported using clobetasol shampoo 5x/week and ketoconazole 2% shampoo 2x/week. She had a few new spots on scalp which improved with clobetasol foam. She previously received ILK injections in . She noticed depressed areas at 3 of the injection sites with 2 developing after her most recent injections. She noticed the first depressed area improving with time. She has not had any burning, pruritus, pain, or bleeding on her scalp. She also received care from the dermatologist Dr. Fuentes of the [facility].     Today, Ms. Fritz reports that she has been staying healthy and does not have a symptomatic scalp. She has continued using clobetasol shampoo 4x/week and ketoconazole 2x/week. She recently began taking Sertraline for anxiety. She mentioned that she briefly switched out her multivitamin for a prenatal supplement, although she switched back when she realized there was no iron in the ;prenatal supplement. No other concerns today and in general state of health.    Past Medical History:   Patient Active Problem List   Diagnosis     AA (alopecia areata)     Dermatitis, seborrheic      Loss of hair     Autoimmune disease (H)     Skin atrophy     Past Medical History:   Diagnosis Date     AA (alopecia areata) 2/25/2018     Autoimmune disease (H) 4/1/2018     No past surgical history on file.    Social History:   reports that she has never smoked. She has never used smokeless tobacco.    Family History:  Family History   Problem Relation Age of Onset     Melanoma Paternal Grandmother      Skin Cancer No family hx of        Medications:  Current Outpatient Medications   Medication Sig Dispense Refill     clobetasol propionate (CLOBEX) 0.05 % external shampoo Use 5 times per week. Apply to dry scalp, leave on for 10 minutes, then lather and rinse out. 118 mL 3     clobetasol propionate 0.05 % FOAM Apply sparingly to affected area twice daily as needed for new spots 100 g 1     ketoconazole (NIZORAL) 2 % external shampoo USE ONCE/TWICE PER WEEK. LATHER, LET SIT THEN RINSE OFF IN SHOWER 120 mL 11     ketoconazole (NIZORAL) 2 % shampoo Apply to wet hair and massage into scalp, leave in for 2-3 minutes, then rinse 120 mL 11     sertraline (ZOLOFT) 100 MG tablet Take 100 mg by mouth       triamcinolone acetonide (KENALOG) 10 MG/ML injection See med note 5 mL 0     fluocinonide (LIDEX) 0.05 % solution Apply daily as need to scalp. (Patient not taking: Reported on 4/6/2021) 60 mL 3     predniSONE (DELTASONE) 5 MG tablet 20 mg in am for 1 week, then 15 mg in am for 1 week, then 10 mg in am for 1 week, then 5 mg in am for one week or until next visit.    (Patient not taking: Reported on 4/6/2021) 60 tablet 1     predniSONE (DELTASONE) 5 MG tablet Take daily in the morning following this schedule:  4 tablets every morning for 1 week, then take 3 tabs in am for 1 week,, then 2 tabs every am for 1 week, then1 tab in am for 1 week (Patient not taking: Reported on 4/6/2021) 199 tablet 1      Allergies   Allergen Reactions     Amoxicillin Nausea       Review of Systems:  -Constitutional: Otherwise doing well.    -Skin: As above in HPI. No additional skin concerns.    Physical exam:  Vitals: There were no vitals taken for this visit.  GEN: Well developed, well-nourished, in no acute distress, in a pleasant mood.    SKIN: A focused examination of the face and scalp was performed.  Gonzales type: II  - New AA spots:   - One dime sized spot: Left mid occipital scalp   - One 1.5-2.5 cm spot: Mid occipital scalp   - One 1-2 cm spot: Frontal midscalp   - Negative hair pull tests  - Decreased density and follicular accentuation on the right scalp was noted  - Not able to compare to prior photographs due to abundance of hair in past photos..    Impression/Plan:  1. Alopecia areata  - Continue current tx: Clobetasol 0.05% foam to new areas of alopecia, ILK every 4-6 weeks as needed, Clobex shampoo 5x weekly and 2% ketoconazole 2x weekly, Sertraline, and daily multivitamin  - Review labs at upcoming visit    - Labs: Vitamin D Deficiency, TSH with free T4 reflex, Zinc, Thyroid peroxidase antibody, Ferritin, CBC with platelets diff, Iron and iron binding capacity.    - 2 mL ILK 10 today  - After verbal consent and discussion of risks including but not limited to atrophy, pain, and bruising, cleansing with isopropyl alcohol, time out was performed, 2 total cc of Kenalog 10 mg/cc was injected into 20 sites on the scalp. The patient tolerated the procedure well and left the Dermatology Clinic in good condition.    Follow-up in 3-4 months, earlier for changing or concerning lesions.    Dr. Peña staffed the patient.    Staff Involved:  Scribe/Fellow (Mariam Cantu)/Staff(as above)    Scribe Disclosure  I, Mariam Cantu, am serving as a scribe to document services personally performed by Dr. Jostete Peña MD, based on data collection and the provider's statements to me.      Provider Disclosure:   The documentation recorded by the scribe accurately reflects the services I personally performed and the decisions made by me. ILK  injections were done by me.    Josette Peña MD  Professor and Chair  Department of Dermatology  [facility]: Phone: [phone], Fax:[phone]  [facility]: Phone: [phone], Fax: [phone]

## 2021-04-25 ENCOUNTER — HEALTH MAINTENANCE LETTER (OUTPATIENT)
Age: 26
End: 2021-04-25

## 2021-10-10 ENCOUNTER — HEALTH MAINTENANCE LETTER (OUTPATIENT)
Age: 26
End: 2021-10-10

## 2021-10-22 ENCOUNTER — OFFICE VISIT (OUTPATIENT)
Dept: DERMATOLOGY | Facility: CLINIC | Age: 26
End: 2021-10-22
Payer: COMMERCIAL

## 2021-10-22 DIAGNOSIS — L90.9 SKIN ATROPHY: ICD-10-CM

## 2021-10-22 DIAGNOSIS — M35.9 AUTOIMMUNE DISEASE (H): ICD-10-CM

## 2021-10-22 DIAGNOSIS — L21.9 DERMATITIS, SEBORRHEIC: ICD-10-CM

## 2021-10-22 DIAGNOSIS — L63.9 AA (ALOPECIA AREATA): Primary | ICD-10-CM

## 2021-10-22 PROCEDURE — 99213 OFFICE O/P EST LOW 20 MIN: CPT | Mod: 25 | Performed by: DERMATOLOGY

## 2021-10-22 PROCEDURE — 11901 INJECT SKIN LESIONS >7: CPT | Mod: GC | Performed by: DERMATOLOGY

## 2021-10-22 ASSESSMENT — PAIN SCALES - GENERAL: PAINLEVEL: NO PAIN (0)

## 2021-10-22 NOTE — NURSING NOTE
Dermatology Rooming Note    Amber Fritz's goals for this visit include:   Chief Complaint   Patient presents with     Hair Loss     Amber is here for a follow up for hair loss. She is seeing some improvement in places and loss in others.      Sandra Vincent CMA

## 2021-10-22 NOTE — PATIENT INSTRUCTIONS
Continue the Vit D @ 1000 international unit(s).  Continue your current topicals  We will see you in 12 months; can recheck labs at that time. Can see derm in [place] in the interim.  We injected today.

## 2021-10-22 NOTE — NURSING NOTE
Drug Administration Record    Prior to injection, verified patient identity using patient's name and date of birth.  Due to injection administration, patient instructed to remain in clinic for 15 minutes  afterwards, and to report any adverse reaction to me immediately.    Drug Name: triamcinolone acetonide(kenalog)  Dose: 2mL of triamcinolone 10mg/mL, 20mg dose  Route administered: ID  NDC #: Kenalog-10 (4894-1565-61)  Amount of waste(mL):3mL  Reason for waste: Single use vial    LOT #: [id number]  SITE: SEE PROVIDER'S NOTE  : [email or web address]  EXPIRATION DATE: 03/2023

## 2021-10-22 NOTE — LETTER
10/22/2021       RE: Amber Fritz  [id number] [facility] [address]     Dear Colleague,    Thank you for referring your patient, Amber Fritz, to the [facility] DERMATOLOGY [facility] [place] [facility]. Please see a copy of my visit note below.    [facility] Dermatology Note  Encounter Date: Oct 22, 2021  Office Visit     Dermatology Problem List:  # Alopecia areata (episode began )   - Current tx: clobetasol 0.05% foam to new areas of alopecia, ILK every 4-6 weeks as needed, Clobex shampoo 5x weekly and 2% ketoconazole 2x weekly, Sertraline, daily multivitamin  - Past tx: prednisone, LLLT,  supplement     # Skin atrophy - related to ILK injections; sites avoided  ____________________________________________    Assessment & Plan:     1. Alopecia areata  - Continue current tx: Clobetasol 0.05% foam to new areas of alopecia, ILK every 4-6 weeks as needed, Clobex shampoo 5x weekly and 2% ketoconazole 2x weekly, Sertraline, and daily multivitamin  - ILK 10 today  - Continue following with dermatologist in [place]    Procedures Performed:   - Intra-lesional triamcinolone procedure note. After positioning and cleansing with isopropyl alcohol, 2 total mL of triamcinolone 10 mg/mL was injected into 20 sites on the scalp. The patient tolerated the procedure well and left the dermatology clinic in good condition.    Follow-up: 1 year(s) in-person, or earlier for new or changing lesions    Staff and Scribe:     This patient was staffed with Dr. Peña.    Jerry Coats MD  Internal Medicine - Dermatology PGY 4    Scribe Disclosure:  I, Alcon Delgado, am serving as a scribe to document services personally performed by Josette ePña MD based on data collection and the provider's statements to me.     Provider Disclosure:   The documentation recorded by the scribe accurately reflects the services I personally  performed and the decisions made by me.      Patient was seen and examined with the medicine/dermatology resident. I agree with the history, review of systems, physical examination, assessments and plan. I was present for the key portion of the ILK procedure.    Josette Peña MD  Professor and  Chair  Department of Dermatology  [facility]  ____________________________________________    CC: Hair Loss (Amber is here for a follow up for hair loss. She is seeing some improvement in places and loss in others. )    HPI:  Ms. Amber Fritz is a 26 year old female who presents as a return patient for follow-up of hair loss, diagnosed as alopecia areata.   - Last seen in-clinic on 4/6/2021, at which time patient received ILK for treatment of alopecia areata.  - Shedding or thinning, or both: stable shedding and density; few areas of patchy loss  - Current tx:  clobetasol 0.05% foam to new areas of alopecia, ILK every 4-6 weeks as needed, Clobex shampoo 5x weekly and 2% ketoconazole 2x weekly, Sertraline, daily multivitamin  - If using Rogaine, 1 cannister lasts how long: NA   - Scalp or hair care habits/products: as above  No Any new medications, supplements, or products? (please list below)     No Scalp pain   No Scalp burning   No Scalp itching    Yes Eyebrow changes - ?increased shedding   Yes Eyelash changes - ?increased shedding   No Beard changes    No Other body hair changes    No Nail changes - improved   No Additional symptoms? (please list below)     Patient is otherwise feeling well, in usual state of health, and has no additional skin concerns today.     Labs:  Iron studies , TSH and Vitamin D reviewed.    Physical Exam:  Vitals: There were no vitals taken for this visit.  GEN: Well developed, well-nourished, in no acute distress, in a pleasant mood.    SKIN: Focused examination of scalp and face was performed.  - Patchy loss w/ thinning concentrated on vertex and occipital scalp.   - No  erythema or scale  - Negative hair pull tests  - No other lesions of concern on areas examined.   - See global photographs      Medications:  Current Outpatient Medications   Medication     clobetasol propionate (CLOBEX) 0.05 % external shampoo     clobetasol propionate 0.05 % FOAM     ketoconazole (NIZORAL) 2 % external shampoo     ketoconazole (NIZORAL) 2 % shampoo     sertraline (ZOLOFT) 100 MG tablet     triamcinolone acetonide (KENALOG) 10 MG/ML injection     fluocinonide (LIDEX) 0.05 % solution     predniSONE (DELTASONE) 5 MG tablet     predniSONE (DELTASONE) 5 MG tablet     Current Facility-Administered Medications   Medication     triamcinolone acetonide (KENALOG-10) injection 20 mg      Past Medical History:   Patient Active Problem List   Diagnosis     AA (alopecia areata)     Dermatitis, seborrheic     Loss of hair     Autoimmune disease (H)     Skin atrophy     Past Medical History:   Diagnosis Date     AA (alopecia areata) 2/25/2018     Autoimmune disease (H) 4/1/2018       CC Referred Self, MD  No address on file on close of this encounter.      Again, thank you for allowing me to participate in the care of your patient.      Sincerely,    Josette Peña MD

## 2021-10-22 NOTE — PROGRESS NOTES
[facility] Dermatology Note  Encounter Date: Oct 22, 2021  Office Visit     Dermatology Problem List:  # Alopecia areata (episode began )   - Current tx: clobetasol 0.05% foam to new areas of alopecia, ILK every 4-6 weeks as needed, Clobex shampoo 5x weekly and 2% ketoconazole 2x weekly, Sertraline, daily multivitamin  - Past tx: prednisone, LLLT, caitlyn supplement     # Skin atrophy - related to ILK injections; sites avoided  ____________________________________________    Assessment & Plan:     1. Alopecia areata  - Continue current tx: Clobetasol 0.05% foam to new areas of alopecia, ILK every 4-6 weeks as needed, Clobex shampoo 5x weekly and 2% ketoconazole 2x weekly, Sertraline, and daily multivitamin  - ILK 10 today  - Continue following with dermatologist in [place]    Procedures Performed:   - Intra-lesional triamcinolone procedure note. After positioning and cleansing with isopropyl alcohol, 2 total mL of triamcinolone 10 mg/mL was injected into 20 sites on the scalp. The patient tolerated the procedure well and left the dermatology clinic in good condition.    Follow-up: 1 year(s) in-person, or earlier for new or changing lesions    Staff and Scribe:     This patient was staffed with Dr. Peña.    Jerry Coats MD  Internal Medicine - Dermatology PGY 4    Scribe Disclosure:  I, Alcon Delgado, am serving as a scribe to document services personally performed by Josette Peña MD based on data collection and the provider's statements to me.     Provider Disclosure:   The documentation recorded by the scribe accurately reflects the services I personally performed and the decisions made by me.      Patient was seen and examined with the medicine/dermatology resident. I agree with the history, review of systems, physical examination, assessments and plan. I was present for the key portion of the ILK procedure.    Josette Peña MD  Professor and  Chair  Department  of Dermatology  [facility]  ____________________________________________    CC: Hair Loss (Amber is here for a follow up for hair loss. She is seeing some improvement in places and loss in others. )    HPI:  Ms. Amber Fritz is a 26 year old female who presents as a return patient for follow-up of hair loss, diagnosed as alopecia areata.   - Last seen in-clinic on 4/6/2021, at which time patient received ILK for treatment of alopecia areata.  - Shedding or thinning, or both: stable shedding and density; few areas of patchy loss  - Current tx:  clobetasol 0.05% foam to new areas of alopecia, ILK every 4-6 weeks as needed, Clobex shampoo 5x weekly and 2% ketoconazole 2x weekly, Sertraline, daily multivitamin  - If using Rogaine, 1 cannister lasts how long: NA   - Scalp or hair care habits/products: as above  No Any new medications, supplements, or products? (please list below)     No Scalp pain   No Scalp burning   No Scalp itching    Yes Eyebrow changes - ?increased shedding   Yes Eyelash changes - ?increased shedding   No Beard changes    No Other body hair changes    No Nail changes - improved   No Additional symptoms? (please list below)     Patient is otherwise feeling well, in usual state of health, and has no additional skin concerns today.     Labs:  Iron studies , TSH and Vitamin D reviewed.    Physical Exam:  Vitals: There were no vitals taken for this visit.  GEN: Well developed, well-nourished, in no acute distress, in a pleasant mood.    SKIN: Focused examination of scalp and face was performed.  - Patchy loss w/ thinning concentrated on vertex and occipital scalp.   - No erythema or scale  - Negative hair pull tests  - No other lesions of concern on areas examined.   - See global photographs      Medications:  Current Outpatient Medications   Medication     clobetasol propionate (CLOBEX) 0.05 % external shampoo     clobetasol propionate 0.05 % FOAM     ketoconazole (NIZORAL) 2 % external  shampoo     ketoconazole (NIZORAL) 2 % shampoo     sertraline (ZOLOFT) 100 MG tablet     triamcinolone acetonide (KENALOG) 10 MG/ML injection     fluocinonide (LIDEX) 0.05 % solution     predniSONE (DELTASONE) 5 MG tablet     predniSONE (DELTASONE) 5 MG tablet     Current Facility-Administered Medications   Medication     triamcinolone acetonide (KENALOG-10) injection 20 mg      Past Medical History:   Patient Active Problem List   Diagnosis     AA (alopecia areata)     Dermatitis, seborrheic     Loss of hair     Autoimmune disease (H)     Skin atrophy     Past Medical History:   Diagnosis Date     AA (alopecia areata) 2/25/2018     Autoimmune disease (H) 4/1/2018       CC Referred Self, MD  No address on file on close of this encounter.

## 2022-05-21 ENCOUNTER — HEALTH MAINTENANCE LETTER (OUTPATIENT)
Age: 27
End: 2022-05-21

## 2022-09-17 ENCOUNTER — HEALTH MAINTENANCE LETTER (OUTPATIENT)
Age: 27
End: 2022-09-17

## 2022-10-04 ENCOUNTER — TELEPHONE (OUTPATIENT)
Dept: DERMATOLOGY | Facility: CLINIC | Age: 27
End: 2022-10-04

## 2022-10-04 NOTE — TELEPHONE ENCOUNTER
LVM stating the need to r/s 10/18 as it's a little too late in the morning for Dr. Peña's meetings. Will be sending a mc message too explaining the situation.    Morgan Spann  In Clinic Visit Facilitator

## 2022-10-05 NOTE — TELEPHONE ENCOUNTER
Pt returned the call and okay per Destiny RABAGO to schedule on 11/1 at 10:15am. Appt rescheduled..Cely Bro RN

## 2022-11-01 ENCOUNTER — OFFICE VISIT (OUTPATIENT)
Dept: DERMATOLOGY | Facility: CLINIC | Age: 27
End: 2022-11-01
Payer: COMMERCIAL

## 2022-11-01 VITALS — SYSTOLIC BLOOD PRESSURE: 106 MMHG | HEART RATE: 59 BPM | DIASTOLIC BLOOD PRESSURE: 66 MMHG | OXYGEN SATURATION: 99 %

## 2022-11-01 DIAGNOSIS — M35.9 AUTOIMMUNE DISEASE (H): ICD-10-CM

## 2022-11-01 DIAGNOSIS — L63.9 AA (ALOPECIA AREATA): Primary | ICD-10-CM

## 2022-11-01 LAB
BASOPHILS # BLD AUTO: 0.1 10E3/UL (ref 0–0.2)
BASOPHILS NFR BLD AUTO: 1 %
EOSINOPHIL # BLD AUTO: 0.2 10E3/UL (ref 0–0.7)
EOSINOPHIL NFR BLD AUTO: 4 %
ERYTHROCYTE [DISTWIDTH] IN BLOOD BY AUTOMATED COUNT: 13 % (ref 10–15)
FERRITIN SERPL-MCNC: 20 NG/ML (ref 12–150)
HCT VFR BLD AUTO: 40.4 % (ref 35–47)
HGB BLD-MCNC: 13.2 G/DL (ref 11.7–15.7)
IMM GRANULOCYTES # BLD: 0 10E3/UL
IMM GRANULOCYTES NFR BLD: 0 %
IRON SATN MFR SERPL: 20 % (ref 15–46)
IRON SERPL-MCNC: 69 UG/DL (ref 35–180)
LYMPHOCYTES # BLD AUTO: 1.6 10E3/UL (ref 0.8–5.3)
LYMPHOCYTES NFR BLD AUTO: 36 %
MCH RBC QN AUTO: 30.1 PG (ref 26.5–33)
MCHC RBC AUTO-ENTMCNC: 32.7 G/DL (ref 31.5–36.5)
MCV RBC AUTO: 92 FL (ref 78–100)
MONOCYTES # BLD AUTO: 0.3 10E3/UL (ref 0–1.3)
MONOCYTES NFR BLD AUTO: 7 %
NEUTROPHILS # BLD AUTO: 2.3 10E3/UL (ref 1.6–8.3)
NEUTROPHILS NFR BLD AUTO: 52 %
NRBC # BLD AUTO: 0 10E3/UL
NRBC BLD AUTO-RTO: 0 /100
PLATELET # BLD AUTO: 263 10E3/UL (ref 150–450)
RBC # BLD AUTO: 4.39 10E6/UL (ref 3.8–5.2)
TIBC SERPL-MCNC: 339 UG/DL (ref 240–430)
TSH SERPL DL<=0.005 MIU/L-ACNC: 1.3 MU/L (ref 0.4–4)
WBC # BLD AUTO: 4.3 10E3/UL (ref 4–11)

## 2022-11-01 PROCEDURE — 84443 ASSAY THYROID STIM HORMONE: CPT | Performed by: DERMATOLOGY

## 2022-11-01 PROCEDURE — 36415 COLL VENOUS BLD VENIPUNCTURE: CPT | Performed by: DERMATOLOGY

## 2022-11-01 PROCEDURE — 83550 IRON BINDING TEST: CPT | Performed by: DERMATOLOGY

## 2022-11-01 PROCEDURE — 83540 ASSAY OF IRON: CPT | Performed by: DERMATOLOGY

## 2022-11-01 PROCEDURE — 82728 ASSAY OF FERRITIN: CPT | Performed by: DERMATOLOGY

## 2022-11-01 PROCEDURE — 99213 OFFICE O/P EST LOW 20 MIN: CPT | Mod: 25 | Performed by: DERMATOLOGY

## 2022-11-01 PROCEDURE — 82306 VITAMIN D 25 HYDROXY: CPT | Performed by: DERMATOLOGY

## 2022-11-01 PROCEDURE — 11901 INJECT SKIN LESIONS >7: CPT | Performed by: DERMATOLOGY

## 2022-11-01 PROCEDURE — 85025 COMPLETE CBC W/AUTO DIFF WBC: CPT | Performed by: DERMATOLOGY

## 2022-11-01 ASSESSMENT — PAIN SCALES - GENERAL: PAINLEVEL: NO PAIN (0)

## 2022-11-01 NOTE — NURSING NOTE
Amber Fritz's chief complaint for this visit includes:  Chief Complaint   Patient presents with     RECHECK     Hair loss follow up.      PCP: Rashid Rocha    Referring Provider:  No referring provider defined for this encounter.    /66 (BP Location: Left arm, Patient Position: Sitting)   Pulse 59   SpO2 99%   No Pain (0)        Allergies   Allergen Reactions     Amoxicillin Nausea         Do you need any medication refills at today's visit? No    Susan Ward LPN

## 2022-11-01 NOTE — LETTER
2022         RE: Amber Fritz  [id number] [facility] [address]        Dear Colleague,    Thank you for referring your patient, Amber Fritz, to the [facility]. Please see a copy of my visit note below.    [id number][facility]  Encounter Date: 2022  Office Visit     Dermatology Problem List:  1. Alopecia areata (episode began )   - Current tx: clobetasol 0.05% foam to new areas of alopecia, ILK every 4-6 weeks as needed, Clobex shampoo 5x weekly and 2% ketoconazole 2x weekly, Sertraline, daily multivitamin  - Past tx: prednisone, LLLT, caitlyn supplement     1. Skin atrophy - related to ILK injections; sites avoided  ____________________________________________     Assessment & Plan:      #  Alopecia areata  - Continue current tx: Clobetasol 0.05% foam to new areas of alopecia, ILK every 4-6 weeks as needed, Clobex shampoo 5x weekly and 2% ketoconazole 2x weekly, Sertraline, and daily multivitamin  - ILK injection today, see procedure note.  - Continue following with dermatologist in [place]    # {DermDiagnoses:147964}.  {TreatmentPlans:713772}   - ***     Procedures Performed:   {kkprocedurenotes:142320}    Follow-up: {kkfollowup:690717}    Staff and Scribe:     Scribe Disclosure:   I, Rafael Hook, am serving as a scribe to document services personally performed by this physician, Dr. Josette Peña, based on data collection and the provider's statements to me.      ***  ____________________________________________    CC: No chief complaint on file.    HPI:  Ms. Amber Fritz is a 27 year old female who presents as a return patient for follow-up of hair loss, diagnosed as alopecia areata.   - Last seen in-clinic on 10/22/21  - Shedding or thinning, or both: ***  - Current tx: clobetasol 0.05% foam, clobex shampoo 5x weekly, ketoconazole 2% 2x weekly  - If using Rogaine, 1 cannister lasts  how long: ***   - Scalp or hair care habits/products: ***  ***Yes/No Any new medications, supplements, or products? (please list below)     ***Yes/No Scalp pain   ***Yes/No Scalp burning   ***Yes/No Scalp itching    ***Yes/No Eyebrow changes    ***Yes/No Eyelash changes   ***Yes/No Beard changes    ***Yes/No Other body hair changes    ***Yes/No Nail changes    ***Yes/No Additional symptoms? (please list below)     - Overall course: ***    Patient is otherwise feeling well, in usual state of health, and has no additional skin concerns today.       Ms. Amber Fritz is a 27 year old female who presents as a new patient for evaluation of hair loss.   - Seen at the request of: ***  - Reason for referral: ***  - Onset of hair loss: ***  - Affected areas: ***  - Shedding or thinning, or both: ***  - Percentage of hair loss: ***  ***Yes/No Scalp pain   ***Yes/No Scalp burning   ***Yes/No Scalp itching    ***Yes/No Eyebrow changes    ***Yes/No Eyelash changes   ***Yes/No Beard changes    ***Yes/No Other body hair changes    ***Yes/No Nail changes    ***Yes/No Additional symptoms? (please list below)     - Current treatments: ***  - Prior discontinued treatments: ***  - Prior biopsies: *** (records available: ***)  - Prior labs: *** (records available: ***)  - Scalp or hair care habits/products: ***  - - - Which products? How many times per week?   - - - Frequency of hair sprays, gels, dyes, heat styling, perms, weaves or extensions?  - - - Sunscreen use on face or scalp? If so, what brand?   - Menses: regular and without heavy flow post-menopausal***   - Unwanted hair growth/hirsutism: ***none  - Diet (meat, vegetarian, mixed): ***  - Recent weight loss: none***  - Personal history of thyroid dysfunction or autoimmune disease: ***none  - Family history of hair loss: ***none  - Family history of autoimmune disease: ***none  - Major family, financial, school/work, or other social stressors in the few months prior to hair  loss: ***  - Major recent illness/hospitalizations: ***none  - COVID status: ***yes/no/unknown, ***date(if known)    Patient is otherwise feeling well, in usual state of health, and has no additional skin concerns today.     {kkROSoptional:212029}    Labs:  {kkreviewedlabs:766549} reviewed.    Physical Exam:  Vitals: There were no vitals taken for this visit.  GEN: Well developed, well-nourished, in no acute distress, in a pleasant mood.    SKIN: {kkSkinExam:830080}  - Gonzales type: ***  - Lavon part width of ***  - The layers of hair regrowth layers were noted to be  - *** cm for the first  - *** cm at the second  - *** cm at the third   - *** cm at the fourth   - *** diffuse erythema   - *** perifollicular erythema  - *** perifollicular scale   - *** scaling of the scalp   - *** negative hair pull test   - *** normal eyelash density  - *** normal eyebrow density  - *** no nail pitting or dystrophy   - *** scalp folliculitis/pustules   - In comparison to prior photographs, ***  - {Skin Exam Derm:380600}.   - No other lesions of concern on areas examined.     ***If FFA:  - R lateral forehead vein: elevated***/neutral***/depressed***  - Midline forehead vein: elevated***/neutral***/depressed***  - L ateral forehead vein: elevated***/neutral***/depressed***  - *** facial papules   - *** measurement of lateral canthus to lateral hairline   - *** measurement nasal bridge to anterior hairline    Medications:  Current Outpatient Medications   Medication     clobetasol propionate (CLOBEX) 0.05 % external shampoo     clobetasol propionate 0.05 % FOAM     fluocinonide (LIDEX) 0.05 % solution     ketoconazole (NIZORAL) 2 % external shampoo     ketoconazole (NIZORAL) 2 % shampoo     predniSONE (DELTASONE) 5 MG tablet     predniSONE (DELTASONE) 5 MG tablet     sertraline (ZOLOFT) 100 MG tablet     triamcinolone acetonide (KENALOG) 10 MG/ML injection     Current Facility-Administered Medications   Medication      triamcinolone acetonide (KENALOG-10) injection 20 mg      Past Medical History:   Patient Active Problem List   Diagnosis     AA (alopecia areata)     Dermatitis, seborrheic     Loss of hair     Autoimmune disease (H)     Skin atrophy     Past Medical History:   Diagnosis Date     AA (alopecia areata) 2/25/2018     Autoimmune disease (H) 4/1/2018       CC No referring provider defined for this encounter. on close of this encounter.  [id number]    Again, thank you for allowing me to participate in the care of your patient.        Sincerely,        Josette Peña MD

## 2022-11-01 NOTE — PATIENT INSTRUCTIONS
Intralesional Kenalog (ILK) Injections    Intralesional steroid injection involves a corticosteroid, such as triamcinolone acetonide (brand name Kenalog), which is injected directly into a lesion on or immediately below the skin. Intralesional kenalog may be used to treat the following skin conditions:    Alopecia areata  Discoid lupus erythematosus  Keloids/hypertrophic scars  Granuloma annulare and other granulomatous disorders  Hypertrophic lichen planus  Lichen simplex chronicus (neurodermatitis)  Localised psoriasis  Necrobiosis lipoidica  Acne cysts (nodulocystic acne)  Small infantile hemangiomas    Possible side-effects of intralesional Kenalog (ILK) injections include bleeding, pain, infection, contact dermatitis, nerve damage, scar formation and need for a repeat procedure.    Some people may experience delayed side-effects including:  Lipoatrophy, appearing as skin indentations or dimples around the injection sites a few weeks after treatment; these may be permanent.  White marks (leukoderma) or brown marks (postinflammatory pigmentation) at the site of injection or spreading from the site of injection - these may resolve or persist long term.  Telangiectasia, or small dilated blood vessels at the site of injection.   Increased hair growth at the site of injection - this resolves eventually.  Steroid acne: steroids increase growth hormone, leading to increased sebum (oil) production by the sebaceous glands. Steroid acne generally improves once the steroid has been stopped.      Who should I call with questions?  [facility]: [phone]   [facility]: [phone]  For urgent needs outside of business hours call the [facility] at [phone] and ask for the dermatology resident on call

## 2022-11-02 LAB — DEPRECATED CALCIDIOL+CALCIFEROL SERPL-MC: 44 UG/L (ref 20–75)

## 2022-11-15 ENCOUNTER — TELEPHONE (OUTPATIENT)
Dept: DERMATOLOGY | Facility: CLINIC | Age: 27
End: 2022-11-15

## 2022-11-15 NOTE — TELEPHONE ENCOUNTER
[facility] Call Center    Phone Message    May a detailed message be left on voicemail: yes     Reason for Call:  Pt is asking if we know if Dr. Peña accepts [employer] app or [employer]  Health insurance? Please call pt to discuss. Thanks     Action Taken: Message routed to:  [facility] Derm    Travel Screening: Not Applicable

## 2023-01-30 PROCEDURE — 99213 OFFICE O/P EST LOW 20 MIN: CPT | Mod: 25 | Performed by: DERMATOLOGY

## 2023-01-30 PROCEDURE — 11901 INJECT SKIN LESIONS >7: CPT | Performed by: DERMATOLOGY

## 2023-01-30 NOTE — NURSING NOTE
Clinic Administered Medication Documentation    Administrations This Visit     triamcinolone acetonide (KENALOG-10) injection 20 mg     Admin Date  11/01/2022 Action  Given Dose  20 mg Route  Intra-Lesional Site   Administered By  Rosalind Sood LPN    Ordering Provider: Josette Peña MD    NDC: 3103-3054-61    Lot#: [id number]    : [facility] (PRIMARY CARE)    Patient Supplied?: No           Admin Date  11/01/2022 Action  Given Dose  20 mg Route  Intra-Lesional Site   Administered By  Rosalind Sood LPN    Ordering Provider: Josette Peña MD    NDC: 4505-9259-54    Lot#: [id number]    : [facility] (PRIMARY CARE)    Patient Supplied?: No           Admin Date  01/30/2023 Action  Given Dose  20 mg Route  Intra-Lesional Site   Administered By  Rosalind Sood LPN    Ordering Provider: Josette Peña MD    NDC: 7063-2277-17    Lot#: [id number]    : [facility] (PRIMARY CARE)    Patient Supplied?: No                Rosalind Sood LPN

## 2023-05-06 ENCOUNTER — HEALTH MAINTENANCE LETTER (OUTPATIENT)
Age: 28
End: 2023-05-06

## 2024-07-14 ENCOUNTER — HEALTH MAINTENANCE LETTER (OUTPATIENT)
Age: 29
End: 2024-07-14

## 2024-11-04 NOTE — PROGRESS NOTES
RAYMUNDO: 12/16/2024 34w0d    Did not pass 1 hr OGTT, could not tolerate 3 hr OGG. Had vasovagal response       Current regimen:  NPH 16 units nightly  Lispro Humalog 4 units breakfast; 7 units with lunch; and 10 units with dinner      New regimen:  NPH 20 units nightly  Lispro Humalog 6 units breakfast; 7 units with lunch; and 13 units with dinner                 [facility] Dermatology Note  Encounter Date: 2022  Office Visit     Dermatology Problem List:  1. Alopecia areata (episode began )   - Current tx: clobetasol 0.05% foam to new areas of alopecia, ILK every 4-6 weeks as needed, Clobex shampoo 5x weekly and 2% ketoconazole 2x weekly, Sertraline, daily multivitamin  - Past tx: prednisone, LLLT, caitlyn supplement     2. Skin atrophy - related to ILK injections; sites avoided  ____________________________________________     Assessment & Plan:      #  Alopecia areata  Patient reports 2 new spots of hair loss. Compared to prior photos, hair is improved. AA is well controlled with current topical regiment, will continue. Will inject with Kenalog in the left upper parietal scalp and crown for further treatment, will obtain baseline labs today.  - Labs ordered: TSH, CBC, Vit D, and ferritin.  - Continue clobetasol 0.05% foam   - Continue clobex shampoo 5x weekly  - Continue ketoconazole 2% shampoo 2x weekly.  - ILK injection today, see procedure note.      Procedures Performed:   - Intra-lesional triamcinolone procedure note. After verbal consent and review of risk of pain and skin thinning/atrophy, positioning and cleansing with isopropyl alcohol, 2 total mL of triamcinolone 10 mg/mL was injected into 20 sites on the left upper parietal and crown. The patient tolerated the procedure well and left the dermatology clinic in good condition.     Follow-up: 1 year(s) in-person for follow up, or earlier for new or changing lesions    Staff and Scribe:     Scribe Disclosure:   I, Rafael Hook, am serving as a scribe to document services personally performed by this physician, Dr. Josette Peña, based on data collection and the provider's statements to me.      Provider Disclosure:   The documentation recorded by the scribe accurately reflects the services I personally performed and the decisions made by me. ILK injections were done by  me.    Josette Peña MD  Professor and Chair  Department of Dermatology  [facility]: Phone: [phone], Fax:[phone]  [facility]: Phone: [phone], Fax: [phone]      ____________________________________________    CC: RECHECK (Hair loss follow up. )    HPI:  Ms. Amber Fritz is a 27 year old female who presents as a return patient for follow-up of hair loss, diagnosed as alopecia areata.   - Last seen in-clinic on 10/22/21  - Current tx: clobetasol 0.05% foam, clobex shampoo 5x weekly, ketoconazole 2% 2x weekly    No Any new medications, supplements, or products? (please list below)     No Scalp pain   No Scalp burning   No Scalp itching    No Eyebrow changes    No Eyelash changes   No Other body hair changes    No Nail changes    No Additional symptoms? (please list below)     - Overall course: She notes 2 new spots of hair loss. She notes that the clobex is helping to slow spreading of the hair loss.     Patient is otherwise feeling well, in usual state of health, and has no additional skin concerns today.     Labs:  None reviewed.    Physical Exam:  Vitals: /66 (BP Location: Left arm, Patient Position: Sitting)   Pulse 59   SpO2 99%   GEN: Well developed, well-nourished, in no acute distress, in a pleasant mood.    SKIN: Focused examination of scalp, face and hands was performed.  - No diffuse erythema   - No perifollicular erythema  - No perifollicular scale   - No scaling of the scalp   - Hair thinning noted on left posterior scalp, 25-30% decrease in density.   - Quarter-sized spot on the vertex with hair regrowth centrally.  - In comparison to prior photographs, previously present area on mid frontal and anterior scalp appear improved, hair regrowth noted on occipital scalp, mild thinning on left upper parietal scalp, stable on left temple, improvement on right scalp,  improvement on frontal scalp noted.   - No other lesions of concern on areas examined.     Medications:  Current Outpatient Medications   Medication     clobetasol propionate (CLOBEX) 0.05 % external shampoo     clobetasol propionate 0.05 % FOAM     fluocinonide (LIDEX) 0.05 % solution     ketoconazole (NIZORAL) 2 % external shampoo     ketoconazole (NIZORAL) 2 % shampoo     predniSONE (DELTASONE) 5 MG tablet     predniSONE (DELTASONE) 5 MG tablet     sertraline (ZOLOFT) 100 MG tablet     triamcinolone acetonide (KENALOG) 10 MG/ML injection     Current Facility-Administered Medications   Medication     triamcinolone acetonide (KENALOG-10) injection 20 mg      Past Medical History:   Patient Active Problem List   Diagnosis     AA (alopecia areata)     Dermatitis, seborrheic     Loss of hair     Autoimmune disease (H)     Skin atrophy     Past Medical History:   Diagnosis Date     AA (alopecia areata) 2/25/2018     Autoimmune disease (H) 4/1/2018       CC No referring provider defined for this encounter. on close of this encounter.

## 2025-07-19 ENCOUNTER — HEALTH MAINTENANCE LETTER (OUTPATIENT)
Age: 30
End: 2025-07-19